# Patient Record
Sex: MALE | Race: OTHER | HISPANIC OR LATINO | Employment: FULL TIME | URBAN - METROPOLITAN AREA
[De-identification: names, ages, dates, MRNs, and addresses within clinical notes are randomized per-mention and may not be internally consistent; named-entity substitution may affect disease eponyms.]

---

## 2018-05-18 LAB
ALBUMIN SERPL BCP-MCNC: 4.4 G/DL (ref 3–5.2)
ALP SERPL-CCNC: 72 U/L (ref 43–122)
ALT SERPL W P-5'-P-CCNC: 49 U/L (ref 9–52)
ANION GAP SERPL CALCULATED.3IONS-SCNC: 11 MMOL/L (ref 5–14)
AST SERPL W P-5'-P-CCNC: 34 U/L (ref 17–59)
BILIRUB SERPL-MCNC: 0.4 MG/DL
BUN SERPL-MCNC: 21 MG/DL (ref 5–25)
CALCIUM SERPL-MCNC: 8.8 MG/DL (ref 8.4–10.2)
CHLORIDE SERPL-SCNC: 103 MEQ/L (ref 97–108)
CO2 SERPL-SCNC: 26 MMOL/L (ref 22–30)
CREATINE, SERUM (HISTORICAL): 0.83 MG/DL (ref 0.7–1.5)
EGFR (HISTORICAL): >60 ML/MIN/1.73 M2
GLUCOSE SERPL-MCNC: 85 MG/DL (ref 70–99)
POTASSIUM SERPL-SCNC: 3.7 MEQ/L (ref 3.6–5)
SODIUM SERPL-SCNC: 141 MEQ/L (ref 137–147)
TOTAL PROTEIN (HISTORICAL): 7.1 G/DL (ref 5.9–8.4)

## 2018-07-15 ENCOUNTER — HOSPITAL ENCOUNTER (EMERGENCY)
Facility: HOSPITAL | Age: 28
Discharge: HOME | End: 2018-07-15
Attending: EMERGENCY MEDICINE

## 2018-07-15 VITALS
DIASTOLIC BLOOD PRESSURE: 105 MMHG | TEMPERATURE: 98.2 F | SYSTOLIC BLOOD PRESSURE: 155 MMHG | OXYGEN SATURATION: 98 % | RESPIRATION RATE: 18 BRPM | HEART RATE: 115 BPM

## 2018-07-15 DIAGNOSIS — N45.1 EPIDIDYMITIS, RIGHT: Primary | ICD-10-CM

## 2018-07-15 LAB
BACTERIA UR QL AUTO: ABNORMAL /HPF
BILIRUB UR QL STRIP: NEGATIVE
CLARITY UR: CLEAR
COLOR UR: ABNORMAL
GLUCOSE UR STRIP-MCNC: NEGATIVE MG/DL
HGB UR QL STRIP.AUTO: 50
KETONES UR STRIP-MCNC: NEGATIVE MG/DL
LEUKOCYTE ESTERASE UR QL STRIP: 25
NITRITE UR QL STRIP: NEGATIVE
NON-SQ EPI CELLS URNS QL MICRO: ABNORMAL /HPF
PH UR STRIP.AUTO: 6 [PH] (ref 4.5–8)
PROT UR STRIP-MCNC: ABNORMAL MG/DL
RBC #/AREA URNS AUTO: ABNORMAL /HPF
SP GR UR STRIP.AUTO: 1.01 (ref 1–1.04)
UROBILINOGEN UA: NEGATIVE MG/DL
WBC #/AREA URNS AUTO: ABNORMAL /HPF

## 2018-07-15 PROCEDURE — 99284 EMERGENCY DEPT VISIT MOD MDM: CPT

## 2018-07-15 PROCEDURE — 81001 URINALYSIS AUTO W/SCOPE: CPT | Performed by: PHYSICIAN ASSISTANT

## 2018-07-15 RX ORDER — DOXYCYCLINE HYCLATE 100 MG/1
100 CAPSULE ORAL 2 TIMES DAILY
Qty: 20 CAPSULE | Refills: 0 | Status: SHIPPED | OUTPATIENT
Start: 2018-07-15 | End: 2018-07-25

## 2018-07-15 RX ORDER — IBUPROFEN 600 MG/1
600 TABLET ORAL EVERY 6 HOURS PRN
Qty: 20 TABLET | Refills: 0 | Status: SHIPPED | OUTPATIENT
Start: 2018-07-15 | End: 2018-08-24 | Stop reason: ALTCHOICE

## 2018-07-15 RX ORDER — IBUPROFEN 600 MG/1
600 TABLET ORAL EVERY 6 HOURS
COMMUNITY
Start: 2017-11-10 | End: 2018-08-24 | Stop reason: SDUPTHER

## 2018-07-15 RX ORDER — CLONIDINE HYDROCHLORIDE 0.2 MG/1
0.2 TABLET ORAL
COMMUNITY
End: 2018-08-24 | Stop reason: ALTCHOICE

## 2018-07-15 RX ORDER — AMLODIPINE BESYLATE AND BENAZEPRIL HYDROCHLORIDE 5; 40 MG/1; MG/1
CAPSULE ORAL EVERY 24 HOURS
COMMUNITY
Start: 2018-05-18 | End: 2018-08-24 | Stop reason: ALTCHOICE

## 2018-07-15 NOTE — DISCHARGE INSTRUCTIONS
Epididimitis   LO QUE NECESITA SABER:   La epididimitis es la inflamación del epidídimo  El epidídimo es un conducto enroscado dentro de mendez escroto  Éste almacena y transporta el esperma de los testículos al pene  La epididimitis aguda dura 6 semanas o menos  La epididimitis crónica dura más de 6 semanas  INSTRUCCIONES SOBRE EL PRANAV HOSPITALARIA:   Regrese a la armani de emergencias si:   · Usted tiene dolor intenso en los testículos  · Deena síntomas empeoran aún después de comenzar el tratamiento con medicamento  Pregúntele a mendez Emmanuel Pedro vitaminas y minerales son adecuados para usted  · Deena síntomas no mejoran dentro de 3 días de recibir tratamiento o regresan después del tratamiento  · Usted tiene un área caliente, jeffrey o sensible en deena testículos  · Usted tiene preguntas o inquietudes acerca de mendez condición o cuidado  Medicamentos:   · Antibióticos  podrían administrarse si la epididimitis se debe a samuel infección bacteriana  · AINEs (Analgésicos antiinflamatorios no esteroides) kaila el ibuprofeno, ayudan a disminuir la inflamación, el dolor y la [place]  Valentine medicamento esta disponible con o sin samuel receta médica  Los AINEs pueden causar sangrado estomacal o problemas renales en ciertas personas  Si usted esta tomando un anticoágulante,  siempre  pregunte si los AINEs son seguros para usted  Siempre tim la etiqueta de valentine medicamento y [place] instrucciones  No administre valentine medicamento a niños menores de 6 meses de merry sin antes obtener la autorización de mendez médico      · El acetaminofén  ivana el dolor y baja la fiebre  Está disponible sin receta médica  Pregunte la cantidad y la frecuencia con que debe tomarlos  [address]  El acetaminofén puede causar daño en el hígado cuando no se tanya de forma correcta  · Un medicamento con receta para el dolor  podrían ser Irl Brakeman  Pregunte cómo janis estos medicamentos de samuel forma sullivan      · [place] deena medicamentos kaila se le haya indicado  Consulte con lynne médico si usted anu que lynne medicamento no le está ayudando o si presenta efectos secundarios  Infórmele si es alérgico a algún medicamento  Mantenga samuel lista actualizada de los [place], las vitaminas y los productos herbales que tanya  Incluya los siguientes datos de los medicamentos: cantidad, frecuencia y motivo de administración  Traiga con usted la lista o los envases de la píldoras a tyesha citas de seguimiento  Lleve la lista de los medicamentos con usted en lolly de samuel emergencia  Cuidados personales:   · Aplique hielo  en los testículos de 15 a 20 minutos cada hora o kaila se le indique  Use un paquete de hielo o ponga hielo molido dentro de [employer]  [place]úbrala con samuel toalla  El hielo ayuda a evitar daño al tejido y a disminuir la inflamación y el dolor  · Descanse en lynne cama  según las indicaciones  Eleve lynne escroto cuando se siente o se acueste para ayudar a reducir la inflamación y el dolor  Podrían pedirle que jinny esto colocando samuel toalla enrollada bajo lynne escroto  · Soporte escrotal  podría recomendarse  Un soporte atlético proporciona soporte al escroto y podría hacerlo sentir más cómodo cuando esté de pie  Pregunte a lynne médico cómo usar un soporte atlético      · No levante objetos pesados  Usted podría empeorar la inflamación si levanta objetos pesados o si se esfuerza  Acuda a tyesha consultas de control con lynne médico según le indicaron  Anote tyesha preguntas para que se acuerde de hacerlas daniella tyesha visitas  © 2017 [address] [employer] Information is for End User's use only and may not be sold, redistributed or otherwise used for commercial purposes  All illustrations and images included in CareNotes® are the copyrighted property of A D A M , Inc  or Delon Euceda  Esta información es sólo para uso en educación  Lynne intención no es darle un consejo médico sobre enfermedades o tratamientos   Colsulte con lynne [place] farmacéutico antes de seguir cualquier régimen médico para saber si es seguro y efectivo para usted

## 2018-07-15 NOTE — ED PROVIDER NOTES
History  Chief Complaint   Patient presents with    Testicle Pain     Right testicle pain "My right one is larger than the left"  Denies fever  Seen at [facility] recently and found to have an infection they gave him a rocephin injection  Took advil yesterday for the pain  32 yo male w/ hx of HTN presents to the Emergency Department for evaluation of acute onset R testicular pain beginning this AM  Yesterday he was evaluated at [facility] urgent care for dysuria and fever, treated empirically w/ azithromycin/ceftriaxone  UA was without leukocytes; GC/Chlamydia pending  He states that his fever and dysuria are now resolved  Denies recent viral illness  No abd pain, N/V/D or hematuria  Denies testicular trauma  Prior to Admission Medications   Prescriptions Last Dose Informant Patient Reported? Taking? amLODIPine-benazepril (LOTREL) 5-40 MG per capsule 7/15/2018 at Unknown time  Yes Yes   Sig: every 24 hours   cloNIDine (CATAPRES) 0 2 mg tablet 7/15/2018 at Unknown time  Yes Yes   Sig: Take 0 2 mg by mouth   ibuprofen (MOTRIN) 600 mg tablet 7/15/2018 at Unknown time  Yes Yes   Sig: Take 600 mg by mouth every 6 (six) hours      Facility-Administered Medications: None       Past Medical History:   Diagnosis Date    Hypertension        No past surgical history on file  No family history on file  I have reviewed and agree with the history as documented  Social History   Substance Use Topics    Smoking status: Never Smoker    Smokeless tobacco: Never Used    Alcohol use Yes      Comment: Social        Review of Systems   Constitutional: Negative for chills, diaphoresis and fever  Gastrointestinal: Negative for abdominal pain, diarrhea, nausea and vomiting  Genitourinary: Positive for scrotal swelling (R) and testicular pain (R)  Negative for discharge, dysuria, flank pain, frequency, hematuria, penile pain, penile swelling and urgency     Musculoskeletal: Negative for arthralgias, back pain and myalgias  Skin: Negative for color change and rash  Allergic/Immunologic: Negative for immunocompromised state  Neurological: Negative for dizziness and light-headedness  Physical Exam  Physical Exam   Constitutional: He is oriented to person, place, and time  He appears well-developed and well-nourished  No distress  HENT:   Head: Normocephalic and atraumatic  Eyes: Pupils are equal, round, and reactive to light  No scleral icterus  Neck: No JVD present  Cardiovascular: Normal rate and regular rhythm  Exam reveals no gallop and no friction rub  No murmur heard  Pulmonary/Chest: No respiratory distress  He has no wheezes  He has no rales  Abdominal: Hernia confirmed negative in the right inguinal area and confirmed negative in the left inguinal area  Genitourinary: Penis normal  Right testis shows mass (R epididymal)  Right testis shows no swelling and no tenderness  Left testis shows no mass, no swelling and no tenderness  Circumcised  No penile erythema  No discharge found  Genitourinary Comments: Bilat cremasteric reflexes absent   Lymphadenopathy: No inguinal adenopathy noted on the right or left side  Neurological: He is alert and oriented to person, place, and time  Skin: Skin is warm and dry  Capillary refill takes less than 2 seconds  He is not diaphoretic  Psychiatric: He has a normal mood and affect  His behavior is normal  Thought content normal    Vitals reviewed        Vital Signs  ED Triage Vitals [07/15/18 1618]   Temperature Pulse Respirations Blood Pressure SpO2   98 2 °F (36 8 °C) (!) 115 18 (!) 155/105 98 %      Temp Source Heart Rate Source Patient Position - Orthostatic VS BP Location FiO2 (%)   Temporal -- -- -- --      Pain Score       3           Vitals:    07/15/18 1618   BP: (!) 155/105   Pulse: (!) 115       Visual Acuity      ED Medications  Medications - No data to display    Diagnostic Studies  Results Reviewed     Procedure Component Value Units Date/Time    Urine Microscopic [[id number]]  (Abnormal) Collected:  07/15/18 1634    Lab Status:  Final result Specimen:  Urine from Urine, Clean Catch Updated:  07/15/18 1658     RBC, UA 0-1 (A) /hpf      WBC, UA 0-1 (A) /hpf      Epithelial Cells Occasional /hpf      Bacteria, UA None Seen /hpf     UA w Reflex to Microscopic [[id number]]  (Abnormal) Collected:  07/15/18 1634    Lab Status:  Final result Specimen:  Urine from Urine, Clean Catch Updated:  07/15/18 1647     Color, UA Vanessa (A)     Clarity, UA Clear     Specific Gravity, UA 1 015     pH, UA 6 0     Leukocytes, UA 25 0 (A)     Nitrite, UA Negative     Protein, UA 30 (1+) (A) mg/dl      Glucose, UA Negative mg/dl      Ketones, UA Negative mg/dl      Bilirubin, UA Negative     Blood, UA 50 0 (A)     UROBILINOGEN UA Negative mg/dL                  No orders to display              Procedures  Procedures       Phone Contacts  ED Phone Contact    ED Course  ED Course as of Jul 15 1708   Sun Jul 15, 2018   1630 32 yo male presents w/ acute testicular pain  Treated yesterday for urethritis, swelling began this AM  Pt is non toxic, appears uncomfortable, w/ no reproducible tenderness on exam  Tachycardic but afebrile  Low clinical suspicion for torsion, testis is not high riding  Bilateral cremasteric reflexes are absent  Will obtain UA  Suspect epididymitis at this time  1700 UA without bacteria on micro  Will treat for epididymitis w/ doxycycline x 10d  MDM  Number of Diagnoses or Management Options  Epididymitis, right:   Diagnosis management comments: 32 yo healthy male presents w/ acute testicular swelling  Treated yesterday for urethritis w/ ceftriaxone/azithromycin  GC/Chlamydia testing from yesterday's UC visit pending  Pt displays no clinical findings concerning for torsion  No indication for imaging at this time  Will treat w/ doxy x 10d          Amount and/or Complexity of Data Reviewed  Clinical lab tests: ordered and reviewed  Tests in the medicine section of CPT®: reviewed and ordered  Review and summarize past medical records: yes      CritCare Time    Disposition  Final diagnoses:   Epididymitis, right     Time reflects when diagnosis was documented in both MDM as applicable and the Disposition within this note     Time User Action Codes Description Comment    7/15/2018  5:00 PM Kevin Rodriguez Add [N45 1] Epididymitis, right       ED Disposition     ED Disposition Condition Comment    Discharge  [address] discharge to home/self care  Condition at discharge: Good        Follow-up Information     Follow up With Specialties Details Why [address]  Primary Family Medicine Schedule an appointment as soon as possible for a visit  [address] [facility] [address]  [address]            Patient's Medications   Discharge Prescriptions    DOXYCYCLINE HYCLATE (VIBRAMYCIN) 100 MG CAPSULE    Take 1 capsule (100 mg total) by mouth 2 (two) times a day for 10 days       Start Date: 7/15/2018 End Date: 7/25/2018       Order Dose: 100 mg       Quantity: 20 capsule    Refills: 0    IBUPROFEN (MOTRIN) 600 MG TABLET    Take 1 tablet (600 mg total) by mouth every 6 (six) hours as needed for mild pain       Start Date: 7/15/2018 End Date: --       Order Dose: 600 mg       Quantity: 20 tablet    Refills: 0     No discharge procedures on file      ED Provider  Electronically Signed by           Judson Miranda PA-C  07/15/18 4573

## 2018-07-26 ENCOUNTER — APPOINTMENT (EMERGENCY)
Dept: ULTRASOUND IMAGING | Facility: HOSPITAL | Age: 28
End: 2018-07-26
Payer: COMMERCIAL

## 2018-07-26 ENCOUNTER — HOSPITAL ENCOUNTER (EMERGENCY)
Facility: HOSPITAL | Age: 28
Discharge: HOME | End: 2018-07-26
Attending: EMERGENCY MEDICINE | Admitting: EMERGENCY MEDICINE
Payer: COMMERCIAL

## 2018-07-26 VITALS
HEART RATE: 90 BPM | RESPIRATION RATE: 16 BRPM | DIASTOLIC BLOOD PRESSURE: 85 MMHG | SYSTOLIC BLOOD PRESSURE: 154 MMHG | OXYGEN SATURATION: 99 % | TEMPERATURE: 97.5 F

## 2018-07-26 DIAGNOSIS — N50.811 TESTICULAR PAIN, RIGHT: Primary | ICD-10-CM

## 2018-07-26 PROCEDURE — 99284 EMERGENCY DEPT VISIT MOD MDM: CPT

## 2018-07-26 PROCEDURE — 76870 US EXAM SCROTUM: CPT

## 2018-07-26 NOTE — ED NOTES
Pt was seen here on 7/15/18 for Rt testicular pain  Reports his Rt teste is still swollen and there is still some pain in the Rt teste and Rt groin        Renetta James RN  07/26/18 1354

## 2018-07-26 NOTE — ED PROVIDER NOTES
History  Chief Complaint   Patient presents with    Testicle Pain     Patient states "Im here for a recheck it doesn't hurt when i pee, but my testicle still hurts"  Testicle Pain   Location:  Right testicle  Quality:  Dull ache  Severity:  Mild  Onset quality:  Gradual  Duration:  12 days  Timing:  Constant  Progression:  Unchanged  Chronicity:  New  Context:  Previously diagnosed with epididymitis  Relieved by:  Ibuprofen  Worsened by:  Nothing  Ineffective treatments:  Antibiotics  Associated symptoms: no abdominal pain, no chest pain, no congestion, no cough, no diarrhea, no ear pain, no fatigue, no fever, no headaches, no loss of consciousness, no myalgias, no nausea, no rash, no rhinorrhea, no shortness of breath, no sore throat, no vomiting and no wheezing        Prior to Admission Medications   Prescriptions Last Dose Informant Patient Reported? Taking? amLODIPine-benazepril (LOTREL) 5-40 MG per capsule   Yes No   Sig: every 24 hours   cloNIDine (CATAPRES) 0 2 mg tablet   Yes No   Sig: Take 0 2 mg by mouth   doxycycline hyclate (VIBRAMYCIN) 100 mg capsule   No No   Sig: Take 1 capsule (100 mg total) by mouth 2 (two) times a day for 10 days   ibuprofen (MOTRIN) 600 mg tablet   Yes No   Sig: Take 600 mg by mouth every 6 (six) hours   ibuprofen (MOTRIN) 600 mg tablet   No No   Sig: Take 1 tablet (600 mg total) by mouth every 6 (six) hours as needed for mild pain      Facility-Administered Medications: None       Past Medical History:   Diagnosis Date    Hypertension        History reviewed  No pertinent surgical history  Family History   Problem Relation Age of Onset    Hypertension Mother     Diabetes Mother     Diabetes Maternal Grandmother     Diabetes Maternal Grandfather     Lung cancer Family      I have reviewed and agree with the history as documented      Social History   Substance Use Topics    Smoking status: Never Smoker    Smokeless tobacco: Never Used    Alcohol use Yes Comment: Social        Review of Systems   Constitutional: Negative for activity change, appetite change, chills, fatigue and fever  HENT: Negative for congestion, ear pain, rhinorrhea, sneezing and sore throat  Eyes: Negative for pain and visual disturbance  Respiratory: Negative for cough, shortness of breath and wheezing  Cardiovascular: Negative for chest pain and palpitations  Gastrointestinal: Negative for abdominal pain, blood in stool, constipation, diarrhea, nausea and vomiting  Genitourinary: Positive for scrotal swelling and testicular pain  Negative for decreased urine volume, discharge, dysuria, frequency, genital sores, hematuria, penile pain, penile swelling and urgency  Musculoskeletal: Negative for arthralgias, myalgias, neck pain and neck stiffness  Skin: Negative for rash and wound  Neurological: Negative for dizziness, loss of consciousness, weakness, light-headedness, numbness and headaches  All other systems reviewed and are negative  Physical Exam  Physical Exam   Constitutional: He is oriented to person, place, and time  He appears well-developed and well-nourished  No distress  HENT:   Head: Normocephalic and atraumatic  Nose: Nose normal    Eyes: Conjunctivae and EOM are normal  Pupils are equal, round, and reactive to light  Neck: Normal range of motion  Neck supple  Cardiovascular: Normal rate, regular rhythm, normal heart sounds and intact distal pulses  Exam reveals no gallop and no friction rub  No murmur heard  Pulmonary/Chest: Effort normal and breath sounds normal  No respiratory distress  He has no wheezes  He has no rales  Abdominal: Soft  He exhibits no distension  There is no tenderness  Genitourinary:   Genitourinary Comments: Mild tenderness to right testicular palpation  NO obvious swelling or erythema noted  Absent right cremaster reflex, sluggish left cremaster reflex though present   Musculoskeletal: Normal range of motion   He exhibits no edema, tenderness or deformity  Lymphadenopathy:     He has no cervical adenopathy  Neurological: He is alert and oriented to person, place, and time  Skin: Skin is warm and dry  Capillary refill takes less than 2 seconds  He is not diaphoretic  No erythema  No pallor  Nursing note and vitals reviewed  Vital Signs  ED Triage Vitals [07/26/18 1449]   Temperature Pulse Respirations Blood Pressure SpO2   97 5 °F (36 4 °C) 90 16 154/85 99 %      Temp Source Heart Rate Source Patient Position - Orthostatic VS BP Location FiO2 (%)   Temporal -- -- -- --      Pain Score       --           Vitals:    07/26/18 1449   BP: 154/85   Pulse: 90       Visual Acuity      ED Medications  Medications - No data to display    Diagnostic Studies  Results Reviewed     None                 US scrotum and testicles   Final Result by Nate Delgado DO (07/26 1736)       Unremarkable testes without evidence of torsion  Small mildly complicated bilateral hydroceles  Workstation performed: [id number]                    Procedures  Procedures       Phone Contacts  ED Phone Contact    ED Course  ED Course as of Jul 29 0847   Thu Jul 26, 2018   1752 Otherwise unremarkable US other than mild BL hydrocele  Plan will be to talk to urology about patient to attempt to establish follow up     [phone] No call back from urology  Apparently there is no one on call for [facility] for urology and urology SALLY Lundy who is on call for SLA, SLQ, and [facility] did not return page  MDM  Number of Diagnoses or Management Options  Testicular pain, right: new and requires workup  Diagnosis management comments: Patient is a 76-year-old male with recent diagnosis of epididymitis presents to the emergency department for evaluation of testicular pain  PT states that he was first disgnosed with urethitis at urgent care then was diagnosed the next day with epididymitis at this ED   He was started on doxycycline and ibuprofen  States that ibuprofen would help with the pain but is still have persistent pain despite completing the abx  STates that pain is in his right scrotum  Patient has not seen Urology and was not given any number for Urology  No fevers, chills, sweats  No dysuria symptoms currently at this time  Plan will be to get testicular ultrasound and have patient follow up Urology  Amount and/or Complexity of Data Reviewed  Tests in the radiology section of CPT®: ordered and reviewed    Risk of Complications, Morbidity, and/or Mortality  Presenting problems: moderate  Diagnostic procedures: moderate  Management options: moderate    Patient Progress  Patient progress: stable    CritCare Time    Disposition  Final diagnoses:   Testicular pain, right     Time reflects when diagnosis was documented in both MDM as applicable and the Disposition within this note     Time User Action Codes Description Comment    7/26/2018  6:32 PM Amanda Brown Add [N50 811] Testicular pain, right       ED Disposition     ED Disposition Condition Comment    Discharge  Joe 128 discharge to home/self care      Condition at discharge: Stable        Follow-up Information     Follow up With Specialties Details Why Jamie Murillo U  [address]  For Urology [place] [facility] Call in 1 day for ED follow up and follow up testicular pain  [facility] [address]  [address] [facility] Emergency Department Emergency Medicine  If symptoms worsen [address] [address] [phone]          Discharge Medication List as of 7/26/2018  6:33 PM      CONTINUE these medications which have NOT CHANGED    Details   amLODIPine-benazepril (LOTREL) 5-40 MG per capsule every 24 hours, Starting Fri 5/18/2018, Historical Med      cloNIDine (CATAPRES) 0 2 mg tablet Take 0 2 mg by mouth, Historical Med      !! ibuprofen (MOTRIN) 600 mg tablet Take 600 mg by mouth every 6 (six) hours, Starting Fri 11/10/2017, Until Sat 11/10/2018, Historical Med      !! ibuprofen (MOTRIN) 600 mg tablet Take 1 tablet (600 mg total) by mouth every 6 (six) hours as needed for mild pain, Starting Sun 7/15/2018, Print       !! - Potential duplicate medications found  Please discuss with provider  STOP taking these medications       doxycycline hyclate (VIBRAMYCIN) 100 mg capsule Comments:   Reason for Stopping:             No discharge procedures on file      ED Provider  Electronically Signed by           Jennifer Brito PA-C  07/29/18 0375

## 2018-07-26 NOTE — DISCHARGE INSTRUCTIONS
Dolor en el escroto   LO QUE NECESITA SABER:   El dolor en el escroto puede ocurrir a cualquier edad  La causa del dolor en el escroto puede ir desde samuel lesión grecia hasta samuel grave afección médica  Es muy importante buscar atención médica inmediata si se presenta dolor en el escroto  El dolor puede ser un signo de advertencia de May  condición grave que necesita tratamiento  Sin atención médica inmediata, puede estar en mayor riesgo de perder un testículo o quedar estéril (imposibilidad de tener hijos)  INSTRUCCIONES SOBRE EL PRANAV HOSPITALARIA:   Regrese a la armani de emergencias si:   · Tiene algún signo de advertencia de un problema grave  · Usted tiene dolor o inflamación que comienza o empeora rápidamente  · Se presentan cambios en la piel del escroto, kaila samuel alondra Mario  · Usted tiene fiebre  Pregúntele a mendez Emmanuel Pedro vitaminas y minerales son adecuados para usted  · Mendez dolor no mejora, incluso después de janis el medicamento para el dolor  · Usted siente un dolor nuevo o el dolor empeora  · Usted tiene preguntas o inquietudes acerca de mendez condición o cuidado  Medicamentos:  Es posible que usted necesite alguno de los siguientes:  · Un medicamento con receta para el dolor  podrían ser Irl Brakeman  Pregunte al médico cómo debe janis valentine medicamento de forma sullivan  Algunos medicamentos recetados para el dolor contienen acetaminofén  No tome otros medicamentos que contengan acetaminofén sin consultarlo con mendez médico  Demasiado acetaminofeno puede causar daño al hígado  Los medicamentos recetados para el dolor podrían causar estreñimiento  Pregunte a mendez médico kaila prevenir o tratar estreñimiento  · AINEs (Analgésicos antiinflamatorios no esteroides) kaila el ibuprofeno, ayudan a disminuir la inflamación, el dolor y la [place]  Valentine medicamento esta disponible con o sin samuel receta médica  Los AINEs pueden causar sangrado estomacal o problemas renales en ciertas personas   Si usted tanya un medicamento anticoagulante, siempre pregúntele a lynne médico si los ERICA son seguros para usted  Siempre tim la etiqueta de valentine medicamento y [place] instrucciones  · Antibióticos  se usan para tratar samuel infección bacteriana  · [place] tyesha medicamentos kaila se le haya indicado  Consulte con lynne médico si usted anu que lynne medicamento no le está ayudando o si presenta efectos secundarios  Infórmele si es alérgico a cualquier medicamento  Mantenga samuel lista actualizada de los [place], las vitaminas y los productos herbales que tanya  Incluya los siguientes datos de los medicamentos: cantidad, frecuencia y motivo de administración  Traiga con usted la lista o los envases de la píldoras a tyesha citas de seguimiento  Lleve la lista de los medicamentos con usted en lolly de samuel emergencia  El manejo de lynne síntomas:   · Use un dispositivo de apoyo, si se lo indican  Un dispositivo de [de-identified], kaila un suspensorio, puede ayudar a mantener el Dorethia English y apoyado  [place] puede ayudar a disminuir el dolor  · Aplíquese hielo en la escroto  El hielo ayuda a disminuir el dolor y la inflamación  Use un paquete de hielo o ponga hielo molido dentro de [employer]  Cubra el paquete o la [place] con samuel toalla antes de colocarlos sobre el escroto  Aplique hielo daniella 15 a 20 minutos por hora o según indicaciones  Acuda a tyesha consultas de control con lynne médico según le indicaron  Anote tyesha preguntas para que se acuerde de hacerlas daniella tyesha visitas  © 2017 [employer] Information is for End User's use only and may not be sold, redistributed or otherwise used for commercial purposes  All illustrations and images included in CareNotes® are the copyrighted property of A D A M , Inc  or Delon [employer]uced[employer]  Esta información es sólo para uso en educación  Lynne intención no es darle un consejo médico sobre enfermedades o tratamientos   Colsulte con lynne médico, enfermera o farmacéutico antes de seguir cualquier régimen médico para saber si es seguro y efectivo para usted

## 2018-08-24 ENCOUNTER — OFFICE VISIT (OUTPATIENT)
Dept: FAMILY MEDICINE CLINIC | Facility: CLINIC | Age: 28
End: 2018-08-24
Payer: COMMERCIAL

## 2018-08-24 VITALS
BODY MASS INDEX: 35.7 KG/M2 | TEMPERATURE: 98 F | SYSTOLIC BLOOD PRESSURE: 160 MMHG | RESPIRATION RATE: 16 BRPM | DIASTOLIC BLOOD PRESSURE: 108 MMHG | WEIGHT: 241 LBS | OXYGEN SATURATION: 98 % | HEART RATE: 80 BPM | HEIGHT: 69 IN

## 2018-08-24 DIAGNOSIS — N50.819 TESTICULAR PAIN: Primary | ICD-10-CM

## 2018-08-24 DIAGNOSIS — I10 ESSENTIAL HYPERTENSION, BENIGN: ICD-10-CM

## 2018-08-24 PROCEDURE — 99214 OFFICE O/P EST MOD 30 MIN: CPT | Performed by: FAMILY MEDICINE

## 2018-08-24 RX ORDER — LEVOFLOXACIN 750 MG/1
750 TABLET ORAL EVERY 24 HOURS
Qty: 7 TABLET | Refills: 0 | Status: SHIPPED | OUTPATIENT
Start: 2018-08-24 | End: 2018-08-31

## 2018-08-24 RX ORDER — OLMESARTAN MEDOXOMIL, AMLODIPINE AND HYDROCHLOROTHIAZIDE TABLET 40/10/25 MG 40; 10; 25 MG/1; MG/1; MG/1
1 TABLET ORAL DAILY
Qty: 30 TABLET | Refills: 5 | Status: SHIPPED | OUTPATIENT
Start: 2018-08-24 | End: 2018-09-21 | Stop reason: SINTOL

## 2018-08-24 NOTE — PROGRESS NOTES
Assessment/Plan:    Essential hypertension, benign  For control hypertension, we are going to switch to a different combination of olmesartan with amlodipine and hydrochlorothiazide and recheck blood pressure in one month  Testicular pain  Seems to me he is having an acute epididymitis despite the treatment every given at the emergency room two weeks ago  I would treat him with more aggressive antibiotic and use tramadol with acetaminophen for pain his I do not want to give NSAIDs can increase the blood pressure higher than it is Already       Diagnoses and all orders for this visit:    Testicular pain  -     levofloxacin (LEVAQUIN) 750 mg tablet; Take 1 tablet (750 mg total) by mouth every 24 hours for 7 days  -     traMADol-acetaminophen (ULTRACET) 37 5-325 mg per tablet; Take 1 tablet by mouth every 8 (eight) hours as needed for moderate pain    Essential hypertension, benign  -     Olmesartan-Amlodipine-HCTZ (TRIBENZOR) 40-10-25 MG TABS; Take 1 tablet by mouth daily          Subjective:      Patient ID: Victor Hugo Booth is a 33 y o  male  PT here for followup post ER  PT continues to have testicular pain, seen by urologist post ER  His pain is mainly in the left testicle and is presenting with a complaint of redness and more tenderness than before  Hypertension   This is a recurrent problem  The current episode started more than 1 year ago  The problem has been waxing and waning since onset  The problem is uncontrolled  Pertinent negatives include no chest pain, headaches or shortness of breath  Risk factors for coronary artery disease include male gender and obesity  The current treatment provides mild improvement         The following portions of the patient's history were reviewed and updated as appropriate: allergies, current medications, past family history, past medical history, past social history, past surgical history and problem list     Review of Systems   Constitutional: Negative for activity change, appetite change, fatigue and fever  HENT: Negative for congestion, dental problem, ear pain, sinus pain and trouble swallowing  Eyes: Negative for discharge, redness and itching  Respiratory: Negative for cough, shortness of breath and wheezing  Cardiovascular: Negative for chest pain  Gastrointestinal: Negative for abdominal distention and abdominal pain  Genitourinary: Positive for scrotal swelling and testicular pain (Persistent pain despite antibiotics)  Negative for difficulty urinating, dysuria and enuresis  Testicular pain   Musculoskeletal: Negative for arthralgias, back pain and gait problem  Neurological: Negative for dizziness and headaches  Hematological: Negative for adenopathy  Does not bruise/bleed easily  Psychiatric/Behavioral: Negative for behavioral problems  Objective:      BP (!) 160/108 (BP Location: Left arm, Patient Position: Sitting, Cuff Size: Large)   Pulse 80   Temp 98 °F (36 7 °C) (Oral)   Resp 16   Ht 5' 9" (1 753 m)   Wt 109 kg (241 lb)   SpO2 98%   BMI 35 59 kg/m²          Physical Exam   Constitutional: He is oriented to person, place, and time  He appears well-developed and well-nourished  HENT:   Head: Normocephalic  Eyes: Pupils are equal, round, and reactive to light  Neck: Normal range of motion  Cardiovascular: Normal rate, regular rhythm and normal heart sounds  Pulmonary/Chest: Effort normal and breath sounds normal    Abdominal: Soft  Bowel sounds are normal    Genitourinary: Penis normal  Right testis shows swelling (And redness is present) and tenderness  Musculoskeletal: Normal range of motion  Neurological: He is alert and oriented to person, place, and time  He has normal reflexes  Skin: Skin is warm and dry  Psychiatric: He has a normal mood and affect   His behavior is normal  Judgment and thought content normal

## 2018-08-24 NOTE — ASSESSMENT & PLAN NOTE
For control hypertension, we are going to switch to a different combination of olmesartan with amlodipine and hydrochlorothiazide and recheck blood pressure in one month

## 2018-08-24 NOTE — PATIENT INSTRUCTIONS
Dolor testicular   LO QUE NECESITA SABER:   El dolor testicular podría comenzar en mendez escroto y extenderse a mendez abdomen  Es probable que usted sienta un dolor lorin y repentino, o dolor sordo que sucede con el paso del [place]  El dolor en tyesha testículos podría ir y venir o podría durar por mucho tiempo  La causa de mendez dolor podría ser desconocida  La causa del dolor en tyesha testículos podría ser samuel infección, trauma, hernia, cálculos renales, o enfermedades de transmisión sexual (ETS)  Usted podría tener samuel protuberancia dolorosa en el escroto  La protuberancia podría ser causada por un engrandecimiento de vena o por líquido que se ha acumulado alrededor de collin de tyesha testículos  Esta protuberancia también podría ser causada por samuel condición médica aún más seria  Parte de mendez testículo podría torcerse  Esta es samuel condición seria que necesita tratamiento tan pronto kaila sea posible  INSTRUCCIONES SOBRE EL PRANAV HOSPITALARIA:   Medicamentos:   · Antibióticos:  Estos medicamentos ayudan a combatir o prevenir infecciones  Alvarado tyesha antibióticos hasta que se los termine, aunque se sienta mejor  · Analgésicos:  Es posible que le receten un medicamento para aliviar el dolor  No espere hasta que el dolor sea severo antes de janis valentine medicamento  · AINEs (analgésicos antiinflamatorios no esteroides):  Estos medicamentos disminuyen la inflamación, dolor y [place]  Los AINEs se pueden obtener sin receta médica  Consulte con mendez médico cuál medicamento es el adecuado para usted  Pregunte cuánto janis y cuándo  Tómelos kaila se le indique  Cuando no se eda de la [employer], los medicamentos antiinflamatorios no esteroides pueden causar sangrado estomacal y problemas renales  · Alvarado tyesha medicamentos kaila se le haya indicado  Consulte con mendez médico si usted nau que mendez medicamento no le está ayudando o si presenta efectos secundarios  Infórmele si es alérgico a cualquier medicamento   Mantenga samuel lista actualizada de los medicamentos, las vitaminas y los productos herbales que tanya  Incluya los siguientes datos de los medicamentos: cantidad, frecuencia y motivo de administración  Traiga con usted la lista o los envases de la píldoras a tyesha citas de seguimiento  Lleve la lista de los medicamentos con usted en lolly de samuel emergencia  Disminuya el malestar:  Con tratamiento, mendez dolor podría mejorar en 1 a 3 días  Mendez condición podría janis hasta 4 semanas para sanar, dependiendo de la causa del dolor que usted siente en los testículos  · Descanse:  Limite mendez actividad hasta que mendez dolor haya disminuido  Descanse más [employer]  No se siente por largos periodos de tiempo  · Compresas frías:  Póngase samuel compresa fría en los testículos para ayudar a aliviar el dolor  Use compresas frías kaila se le indica  · Elevación:  Suavemente ponga samuel toalla doblada debajo de tyesha testículos para elevarlos cuando usted se sienta en samuel silla o se acuesta en samuel cama  [place] le ayudará a aliviar mendez dolor y a disminuir la inflamación  Acuda a samuel consulta de control con mendez médico o mendez urólogo dentro de 3 a 7 días:  Anote tyesha preguntas para que se acuerde de hacerlas daniella tyesha visitas  Relaciones sexuales:  Evite la actividad sexual hasta que haya terminado de janis tyesha antibióticos o hasta que mendez médico le indique que puede tener contacto sexual de manera sullivan  Use condones para reducir el riesgo de contraer enfermedades de transmisión sexual   Comuníquese con mendez médico o urólogo si:   · Usted siente que mendez medicamento o tratamiento no está funcionando  · Usted siente más dolor, sensibilidad o inflamación que antes  · Usted tiene náuseas o fiebre leve  · Usted tiene preguntas o inquietudes acerca de mendez condición o cuidado  Regrese a la armani de emergencias si:   · Usted siente dolor repentino o intenso en los testículos o abdomen  · Usted siente dolor en ambos testículos  · Usted esta vomitando      · Usted tiene fiebre gino  · Mendez dolor aumenta cuando tyesha testículos están en samuel posición elevada  · Mendez escroto se pone color ellie  [place] podría indicar que mendez testículo no está recibiendo el flujo de corey que necesita  © 2017 [employer] Information is for End User's use only and may not be sold, redistributed or otherwise used for commercial purposes  All illustrations and images included in CareNotes® are the copyrighted property of A D A M , Inc  or [employer]  Esta información es sólo para uso en educación  Mendez intención no es darle un consejo médico sobre enfermedades o tratamientos  Colsulte con mendez Catherne Micah farmacéutico antes de seguir cualquier régimen médico para saber si es seguro y efectivo para usted

## 2018-08-24 NOTE — ASSESSMENT & PLAN NOTE
Seems to me he is having an acute epididymitis despite the treatment every given at the emergency room two weeks ago    I would treat him with more aggressive antibiotic and use tramadol with acetaminophen for pain his I do not want to give NSAIDs can increase the blood pressure higher than it is Already

## 2018-09-09 ENCOUNTER — HOSPITAL ENCOUNTER (EMERGENCY)
Facility: HOSPITAL | Age: 28
Discharge: HOME | End: 2018-09-09
Attending: EMERGENCY MEDICINE | Admitting: EMERGENCY MEDICINE
Payer: COMMERCIAL

## 2018-09-09 ENCOUNTER — APPOINTMENT (EMERGENCY)
Dept: ULTRASOUND IMAGING | Facility: HOSPITAL | Age: 28
End: 2018-09-09
Payer: COMMERCIAL

## 2018-09-09 VITALS
SYSTOLIC BLOOD PRESSURE: 142 MMHG | OXYGEN SATURATION: 100 % | HEART RATE: 102 BPM | DIASTOLIC BLOOD PRESSURE: 87 MMHG | BODY MASS INDEX: 34.7 KG/M2 | WEIGHT: 235 LBS | TEMPERATURE: 98.2 F | RESPIRATION RATE: 18 BRPM

## 2018-09-09 DIAGNOSIS — R10.13 EPIGASTRIC ABDOMINAL PAIN: Primary | ICD-10-CM

## 2018-09-09 DIAGNOSIS — K80.20 CALCULUS OF GALLBLADDER WITHOUT CHOLECYSTITIS WITHOUT OBSTRUCTION: ICD-10-CM

## 2018-09-09 DIAGNOSIS — M54.12 RIGHT CERVICAL RADICULOPATHY: ICD-10-CM

## 2018-09-09 LAB
ALBUMIN SERPL BCP-MCNC: 5 G/DL (ref 3–5.2)
ALP SERPL-CCNC: 83 U/L (ref 43–122)
ALT SERPL W P-5'-P-CCNC: 58 U/L (ref 9–52)
ANION GAP SERPL CALCULATED.3IONS-SCNC: 14 MMOL/L (ref 5–14)
APTT PPP: 33 SECONDS (ref 23–34)
AST SERPL W P-5'-P-CCNC: 53 U/L (ref 17–59)
BASOPHILS # BLD AUTO: 0.1 THOUSANDS/ΜL (ref 0–0.1)
BASOPHILS NFR BLD AUTO: 1 % (ref 0–1)
BILIRUB SERPL-MCNC: 0.4 MG/DL
BUN SERPL-MCNC: 18 MG/DL (ref 5–25)
CALCIUM SERPL-MCNC: 9.2 MG/DL (ref 8.4–10.2)
CHLORIDE SERPL-SCNC: 97 MMOL/L (ref 97–108)
CO2 SERPL-SCNC: 28 MMOL/L (ref 22–30)
CREAT SERPL-MCNC: 0.69 MG/DL (ref 0.7–1.5)
EOSINOPHIL # BLD AUTO: 0.2 THOUSAND/ΜL (ref 0–0.4)
EOSINOPHIL NFR BLD AUTO: 2 % (ref 0–6)
ERYTHROCYTE [DISTWIDTH] IN BLOOD BY AUTOMATED COUNT: 13.2 %
GFR SERPL CREATININE-BSD FRML MDRD: 128 ML/MIN/1.73SQ M
GLUCOSE SERPL-MCNC: 100 MG/DL (ref 70–99)
HCT VFR BLD AUTO: 48.1 % (ref 41–53)
HGB BLD-MCNC: 16.6 G/DL (ref 13.5–17.5)
INR PPP: 0.97 (ref 0.89–1.1)
LIPASE SERPL-CCNC: 53 U/L (ref 23–300)
LYMPHOCYTES # BLD AUTO: 2.3 THOUSANDS/ΜL (ref 0.5–4)
LYMPHOCYTES NFR BLD AUTO: 27 % (ref 20–50)
MCH RBC QN AUTO: 31 PG (ref 26–34)
MCHC RBC AUTO-ENTMCNC: 34.6 G/DL (ref 31–36)
MCV RBC AUTO: 90 FL (ref 80–100)
MONOCYTES # BLD AUTO: 0.6 THOUSAND/ΜL (ref 0.2–0.9)
MONOCYTES NFR BLD AUTO: 7 % (ref 1–10)
NEUTROPHILS # BLD AUTO: 5.6 THOUSANDS/ΜL (ref 1.8–7.8)
NEUTS SEG NFR BLD AUTO: 64 % (ref 45–65)
PLATELET # BLD AUTO: 262 THOUSANDS/UL (ref 150–450)
PMV BLD AUTO: 9.2 FL (ref 8.9–12.7)
POTASSIUM SERPL-SCNC: 3.4 MMOL/L (ref 3.6–5)
PROT SERPL-MCNC: 9.1 G/DL (ref 5.9–8.4)
PROTHROMBIN TIME: 10.3 SECONDS (ref 9.5–11.6)
RBC # BLD AUTO: 5.37 MILLION/UL (ref 4.5–5.9)
SODIUM SERPL-SCNC: 139 MMOL/L (ref 137–147)
TROPONIN I SERPL-MCNC: <0.01 NG/ML (ref 0–0.03)
WBC # BLD AUTO: 8.8 THOUSAND/UL (ref 4.5–11)

## 2018-09-09 PROCEDURE — 83690 ASSAY OF LIPASE: CPT | Performed by: EMERGENCY MEDICINE

## 2018-09-09 PROCEDURE — 93005 ELECTROCARDIOGRAM TRACING: CPT

## 2018-09-09 PROCEDURE — 84484 ASSAY OF TROPONIN QUANT: CPT | Performed by: EMERGENCY MEDICINE

## 2018-09-09 PROCEDURE — 36415 COLL VENOUS BLD VENIPUNCTURE: CPT | Performed by: EMERGENCY MEDICINE

## 2018-09-09 PROCEDURE — 85025 COMPLETE CBC W/AUTO DIFF WBC: CPT | Performed by: EMERGENCY MEDICINE

## 2018-09-09 PROCEDURE — 85610 PROTHROMBIN TIME: CPT | Performed by: EMERGENCY MEDICINE

## 2018-09-09 PROCEDURE — 80053 COMPREHEN METABOLIC PANEL: CPT | Performed by: EMERGENCY MEDICINE

## 2018-09-09 PROCEDURE — 99285 EMERGENCY DEPT VISIT HI MDM: CPT

## 2018-09-09 PROCEDURE — 96374 THER/PROPH/DIAG INJ IV PUSH: CPT

## 2018-09-09 PROCEDURE — 76705 ECHO EXAM OF ABDOMEN: CPT

## 2018-09-09 PROCEDURE — C9113 INJ PANTOPRAZOLE SODIUM, VIA: HCPCS

## 2018-09-09 PROCEDURE — 85730 THROMBOPLASTIN TIME PARTIAL: CPT | Performed by: EMERGENCY MEDICINE

## 2018-09-09 RX ORDER — PANTOPRAZOLE SODIUM 40 MG/1
INJECTION, POWDER, FOR SOLUTION INTRAVENOUS
Status: COMPLETED
Start: 2018-09-09 | End: 2018-09-09

## 2018-09-09 RX ORDER — PANTOPRAZOLE SODIUM 40 MG/1
40 INJECTION, POWDER, FOR SOLUTION INTRAVENOUS ONCE
Status: COMPLETED | OUTPATIENT
Start: 2018-09-09 | End: 2018-09-09

## 2018-09-09 RX ADMIN — PANTOPRAZOLE SODIUM 40 MG: 40 INJECTION, POWDER, FOR SOLUTION INTRAVENOUS at 17:42

## 2018-09-09 RX ADMIN — SODIUM CHLORIDE 1000 ML: 9 INJECTION, SOLUTION INTRAVENOUS at 14:40

## 2018-09-09 NOTE — ED PROVIDER NOTES
History  Chief Complaint   Patient presents with    Chest Pain     "I have CP on the right side of my chest and my right upper back and it's going down my right arm  It's going on for one week  I am also nauseated"  Denies v/d     Drinks coffee every day but does not drink sodas  , denies cigarette smoking denies alcohol intake ever, denies any drug use  Denies any use of NSAIDs for any particular reason  Also complains of 1 week history of right shoulder pain radiating down his arm into his right hand and also right infrascapular region and right postero lateral region  He denies any neck pain  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     He is right-handed        History provided by:  Patient   used: No    Chest Pain   Pain location:  Epigastric  Pain quality: aching    Pain quality: not burning, not crushing, not dull, not hot, no pressure, not radiating, not sharp, not shooting, not stabbing and not tearing    Pain radiates to:  Mid back (Right-sided)  Pain radiates to the back: yes    Pain severity:  Mild  Onset quality:  Gradual  Duration:  1 week  Timing:  Constant  Progression:  Waxing and waning  Chronicity:  New  Context: not breathing, no drug use, not eating, no intercourse, not lifting, no movement, not raising an arm, not at rest, no stress and no trauma    Relieved by:  Nothing  Worsened by:  Nothing tried  Ineffective treatments:  None tried  Associated symptoms: abdominal pain (Epigastric) and back pain (Right sided postdural lateral)    Associated symptoms: no AICD problem, no altered mental status, no anorexia, no anxiety, no claudication, no cough, no dizziness, no dysphagia, no fatigue, no fever, no headache, no heartburn, no lower extremity edema, no numbness, no orthopnea, no palpitations, no PND, no shortness of breath, no syncope, not vomiting and no weakness    Risk factors: hypertension    Risk factors: no aortic disease, no birth control, no coronary artery disease, no diabetes mellitus, no Tobias-Danlos syndrome, no high cholesterol, no immobilization, not male, no Marfan's syndrome, not obese, not pregnant, no prior DVT/PE, no smoking and no surgery        Prior to Admission Medications   Prescriptions Last Dose Informant Patient Reported? Taking? Olmesartan-Amlodipine-HCTZ (TRIBENZOR) 40-10-25 MG TABS   No No   Sig: Take 1 tablet by mouth daily   traMADol-acetaminophen (ULTRACET) 37 5-325 mg per tablet   No No   Sig: Take 1 tablet by mouth every 8 (eight) hours as needed for moderate pain      Facility-Administered Medications: None       Past Medical History:   Diagnosis Date    Hypertension        History reviewed  No pertinent surgical history  Family History   Problem Relation Age of Onset    Hypertension Mother     Diabetes Mother     Diabetes Maternal Grandmother     Diabetes Maternal Grandfather     Lung cancer Family      I have reviewed and agree with the history as documented  Social History   Substance Use Topics    Smoking status: Never Smoker    Smokeless tobacco: Never Used    Alcohol use Yes      Comment: Social        Review of Systems   Constitutional: Negative  Negative for fatigue and fever  HENT: Negative  Negative for trouble swallowing  Eyes: Negative  Respiratory: Negative  Negative for apnea, cough, choking, chest tightness, shortness of breath, wheezing and stridor  Cardiovascular: Positive for chest pain ( right mid axillary)  Negative for palpitations, orthopnea, claudication, leg swelling, syncope and PND  Gastrointestinal: Positive for abdominal pain (Epigastric)  Negative for anorexia, heartburn and vomiting  Endocrine: Negative  Genitourinary: Negative  Musculoskeletal: Positive for back pain (Right sided postdural lateral)  Negative for gait problem, joint swelling, myalgias, neck pain and neck stiffness          Right shoulder pain which radiates right infrascapular region right lateral chest region for the past 1 week   Skin: Negative  Allergic/Immunologic: Negative  Neurological: Negative  Negative for dizziness, tremors, seizures, syncope, facial asymmetry, speech difficulty, weakness, light-headedness, numbness and headaches  Hematological: Negative  Psychiatric/Behavioral: Negative  All other systems reviewed and are negative        Physical Exam  Physical Exam    Vital Signs  ED Triage Vitals   Temperature Pulse Respirations Blood Pressure SpO2   09/09/18 1349 09/09/18 1349 09/09/18 1349 09/09/18 1349 09/09/18 1349   98 2 °F (36 8 °C) (!) 122 20 158/84 100 %      Temp src Heart Rate Source Patient Position - Orthostatic VS BP Location FiO2 (%)   -- 09/09/18 1604 09/09/18 1604 09/09/18 1604 --    Monitor Lying Left arm       Pain Score       --                  Vitals:    09/09/18 1349 09/09/18 1604   BP: 158/84 142/87   Pulse: (!) 122 102   Patient Position - Orthostatic VS:  Lying       Visual Acuity      ED Medications  Medications   sodium chloride 0 9 % bolus 1,000 mL (0 mL Intravenous Stopped 9/9/18 1505)   pantoprazole (PROTONIX) injection 40 mg (40 mg Intravenous Given 9/9/18 1742)       Diagnostic Studies  Results Reviewed     Procedure Component Value Units Date/Time    Troponin I [[id number]]  (Normal) Collected:  09/09/18 1454    Lab Status:  Final result Specimen:  Blood from Arm, Left Updated:  09/09/18 1512     Troponin I <0 01 ng/mL     APTT [[id number]]  (Normal) Collected:  09/09/18 1454    Lab Status:  Final result Specimen:  Blood Updated:  09/09/18 1508     PTT 33 seconds     Narrative:       PTT:      Protime-INR [[id number]]  (Normal) Collected:  09/09/18 1454    Lab Status:  Final result Specimen:  Blood Updated:  09/09/18 1508     Protime 10 3 seconds      INR 0 97    Narrative:       INR:  ,PROTIME:      Lipase [[id number]]  (Normal) Collected:  09/09/18 1453    Lab Status:  Final result Specimen:  Blood Updated:  09/09/18 1459     Lipase 53 u/L     Comprehensive metabolic panel [[id number]]  (Abnormal) Collected:  09/09/18 1453    Lab Status:  Final result Specimen:  Blood Updated:  09/09/18 1459     Sodium 139 mmol/L      Potassium 3 4 (L) mmol/L      Chloride 97 mmol/L      CO2 28 mmol/L      ANION GAP 14 mmol/L      BUN 18 mg/dL      Creatinine 0 69 (L) mg/dL      Glucose 100 (H) mg/dL      Calcium 9 2 mg/dL      AST 53 U/L      ALT 58 (H) U/L      Alkaline Phosphatase 83 U/L      Total Protein 9 1 (H) g/dL      Albumin 5 0 g/dL      Total Bilirubin 0 40 mg/dL      eGFR 128 ml/min/1 73sq m     Narrative:         National Kidney Disease Education Program recommendations are as follows:  GFR calculation is accurate only with a steady state creatinine  Chronic Kidney disease less than 60 ml/min/1 73 sq  meters  Kidney failure less than 15 ml/min/1 73 sq  meters  CBC and differential [[id number]]  (Normal) Collected:  09/09/18 1439    Lab Status:  Final result Specimen:  Blood from Arm, Right Updated:  09/09/18 1452     WBC 8 80 Thousand/uL      RBC 5 37 Million/uL      Hemoglobin 16 6 g/dL      Hematocrit 48 1 %      MCV 90 fL      MCH 31 0 pg      MCHC 34 6 g/dL      RDW 13 2 %      MPV 9 2 fL      Platelets 468 Thousands/uL      Neutrophils Relative 64 %      Lymphocytes Relative 27 %      Monocytes Relative 7 %      Eosinophils Relative 2 %      Basophils Relative 1 %      Neutrophils Absolute 5 60 Thousands/µL      Lymphocytes Absolute 2 30 Thousands/µL      Monocytes Absolute 0 60 Thousand/µL      Eosinophils Absolute 0 20 Thousand/µL      Basophils Absolute 0 10 Thousands/µL                  US abdomen limited   Final Result by Liset Salazar MD (09/09 1725)      Fatty infiltration of the liver  Cholelithiasis without signs of cholecystitis              Workstation performed: [id number]                    Procedures  Procedures       Phone Contacts  ED Phone Contact    ED Course  ED Course as of Sep 15 0717   Debby Ort Sep 09, 2018   6698 Discussed case with Dr Bharati Arredondo and signed over care of patient to him  1640 EKG:   Sinus tach 110 per; no acute ischemia noted    1640 The EKG was interpreted by me at 1359                                [facility]  Number of Diagnoses or Management Options  Epigastric abdominal pain:   Right cervical radiculopathy:      Amount and/or Complexity of Data Reviewed  Clinical lab tests: ordered  Tests in the radiology section of CPT®: ordered  Tests in the medicine section of CPT®: ordered and reviewed    Patient Progress  Patient progress: stable    CritCare Time    Disposition  Final diagnoses:   Epigastric abdominal pain   Right cervical radiculopathy   Calculus of gallbladder without cholecystitis without obstruction     Time reflects when diagnosis was documented in both MDM as applicable and the Disposition within this note     Time User Action Codes Description Comment    9/9/2018  4:07 PM Anneliese Hillman Add [R10 13] Epigastric abdominal pain     9/9/2018  4:08 PM Anneliese Hillman Add [M54 12] Right cervical radiculopathy     9/9/2018  5:48 PM Rosaura Ha Add [K80 20] Calculus of gallbladder without cholecystitis without obstruction       ED Disposition     ED Disposition Condition Comment    Discharge  [address] discharge to home/self care      Condition at discharge: Stable        Follow-up Information     Follow up With Specialties Details Why Contact Info    Breanna Balderrama MD Family Medicine In 1 day  [address] [facility] [address]  [address] [facility]  [address]  [address] [place] [address]            Discharge Medication List as of 9/9/2018  5:48 PM      CONTINUE these medications which have NOT CHANGED    Details   Olmesartan-Amlodipine-HCTZ (TRIBENZOR) 40-10-25 MG TABS Take 1 tablet by mouth daily, Starting Fri 8/24/2018, Normal      traMADol-acetaminophen (ULTRACET) 37 5-325 mg per tablet Take 1 tablet by mouth every 8 (eight) hours as needed for moderate pain, Starting Fri 8/24/2018, Normal           No discharge procedures on file      ED Provider  Electronically Signed by           Rajni Ott MD  09/15/18 8549

## 2018-09-09 NOTE — ED NOTES
Pt changed into gown and made comfortable in room  Pt placed on monitor for continuous cardiac monitoring       Chi Tafoya  09/09/18 0245

## 2018-09-09 NOTE — ED CARE HANDOFF
1017 PM  50-year-old male presents emergency department with noted right upper abdomen and right back pain  Patient was evaluated here in the emergency department with cardiac workup as well as ultrasound performed which shows cholelithiasis but no evidence of cholecystitis or biliary obstruction  Here in the emergency department the patient was talked to an and planned plan outpatient management for discharge  Pain is controlled at this point time plan outpatient management followup given strict instructions when to return back to emergency department  Patient agrees with the plan for outpatient management and follow-up  Pt re-examined and evaluated after testing and treatment  Spoke with the patient and feeling improved and sxs have resolved  Will discharge home with close f/u with pcp and instructed to return to the ED if sxs worsen or continue  Pt agrees with the plan for discharge and feels comfortable to go home with proper f/u  Advised to return for worsening or additional problems  Diagnostic tests were reviewed and questions answered  Diagnosis, care plan and treatment options were discussed  The patient understand instructions and will follow up as directed           Sharmila Friedman DO  09/09/18 1205

## 2018-09-09 NOTE — DISCHARGE INSTRUCTIONS
Radiculopatía cervical   LO QUE NECESITA SABER:   La radiculopatía cervical es samuel condición muy dolorosa que sucede cuando se oprime o irrita collin de los nervios de la columna vertebral    INSTRUCCIONES SOBRE EL PRANAV HOSPITALARIA:   Medicamentos:  Es posible que usted necesite alguno de los siguientes:  · AINEs (Analgésicos antiinflamatorios no esteroides)  ayudan a disminuir la inflamación y el dolor  Valentine medicamento puede comprarse sin receta médica  Valentine medicamento puede causar sangrado estomacal o problemas en los riñones en ciertas personas  Si usted tanya un medicamento anticoagulante, asegúrese de preguntarle a mendez médico si los ERICA son seguros para usted  Rosa siempre la etiqueta y siga cuidadosamente las instrucciones antes de usar valentine medicamento  · Un medicamento con receta para el dolor  ayudan a calmar el dolor  No espere hasta que el dolor sea severo antes de janis valentine medicamento  · Esteroides  contribuyen a aliviar el dolor y bajar la inflamación  Estos podrían administrarse kaila píldora o kaila inyección en el abi  Es posible que usted necesite mas de 1 inyección si los síntomas no mejoran después del primer tratamiento  · [place] tyesha medicamentos kaila se le haya indicado  Consulte con mendez médico si usted anu que mendez medicamento no le está ayudando o si presenta efectos secundarios  Infórmele si es alérgico a algún medicamento  Mantenga samuel lista actualizada de los [place], las vitaminas y los productos herbales que tanya  Incluya los siguientes datos de los medicamentos: cantidad, frecuencia y motivo de administración  Traiga con usted la lista o los envases de la píldoras a tyesha citas de seguimiento  Lleve la lista de los medicamentos con usted en lolly de samuel emergencia  Programe samuel ac de control con mendez médico o especialista en columna kaila se lo indicaron:  Anote tyesha preguntas para que se acuerde de hacerlas daniella tyesha visitas     Fisioterapia:  Mendez médico podría recomendarle fisioterapia para elongar y [employer]  El fisioterapeuta puede enseñarle a mejorar la postura y la forma de sostener el abi  También le podría enseñar a estar activo de samuel forma sullivan y a evitar lesiones futuras  Además le puede ayudar a desarrollar un plan de ejercicios que sea seguro para la espalda y el abi  Cuidados personales:   · El hielo  ayuda a disminuir la inflamación y el dolor  El hielo también puede contribuir a evitar el daño de los tejidos  Use un paquete de hielo o ponga hielo molido dentro de [employer]  Cúbrala con samuel toalla y colóquela en el abi por 15 a 20 minutos cada hora o kaila se le indique  · Descanse  cuando sienta que es necesario  Empiece a hacer un poco más día a día  Regrese a tyesha actividades diarias kaila se le haya indicado  · Use un collarín suave  Es posible que le den un collarín suave para sostener el abi mientras duerme  Use el collarín solamente kaila se lo indiquen  · Realice estiramientos suaves y ejercicio regularmente  El médico podría recomendarle que aj ejercicios de elongación suaves para ayudar a disminuir la rigidez en el abi y brazo [place] se recupera  Después de controlar el dolor, usted se podría beneficiar de hacer ejercicio a diario  Pregúntele que clase de ejercicios son seguros para Rozetta Spike y el abi  · [address]  Un área de trabajo cómoda puede ayudarle a evitar tensión en el abi  Pregúntele a mendez empleador que realice samuel revisión ergonómica para revisar la posición del escritorio, silla, teléfono y computadora  Aj cualquier ajuste necesario para mendez comodidad  Comuníquese con mendez médico o especialista en columna si:   · Usted tiene fiebre  · Usted pierde peso sin proponérselo  · El dolor es peor, aún con el medicamento  · [address] se sienten más adormecidas que antes, o no [place] dedos del todo      · Usted tiene preguntas o inquietudes acerca de lynne condición o cuidado  © 2017 [address] [employer] Information is for End User's use only and may not be sold, redistributed or otherwise used for commercial purposes  All illustrations and images included in CareNotes® are the copyrighted property of A D A M , Inc  or Delon Euceda  Esta información es sólo para uso en educación  Lynne intención no es darle un consejo médico sobre enfermedades o tratamientos  Colsulte con lynne Melven Rimes farmacéutico antes de seguir cualquier régimen médico para saber si es seguro y efectivo para usted  Cervical Radiculopathy   WHAT YOU NEED TO KNOW:   Cervical radiculopathy is a painful condition that happens when a spinal nerve in your neck is pinched or irritated  DISCHARGE INSTRUCTIONS:   Medicines: You may need any of the following:  · NSAIDs  help decrease swelling and pain  This medicine can be bought without a doctor's order  This medicine can cause stomach bleeding or kidney problems in certain people  If you take blood thinner medicine, always ask your healthcare provider if NSAIDs are safe for you  Always read the medicine label and follow the directions on it before using this medicine  · Prescription pain medicine  helps decrease pain  Do not wait until the pain is severe before you take this medicine  · Steroids  help decrease pain and swelling  These may be given as a pill or as an injection in your neck  You may need more than 1 injection if your symptoms do not improve after the first treatment  · Take your medicine as directed  Contact your healthcare provider if you think your medicine is not helping or if you have side effects  Tell him of her if you are allergic to any medicine  Keep a list of the medicines, vitamins, and herbs you take  Include the amounts, and when and why you take them  Bring the list or the pill bottles to follow-up visits  Carry your medicine list with you in case of an emergency    Follow up with your healthcare provider or spine specialist as directed:  Write down your questions so you remember to ask them during your visits  Physical therapy:  Your healthcare provider may suggest physical therapy to stretch and strengthen your muscles  Your physical therapist can teach you how to improve your posture and the way you hold your neck  He may also teach you how to be safely active and avoid further injury  He can also help you develop an exercise program that is safe for your back and neck  Self-care:   · Ice  helps decrease swelling and pain  Ice may also help prevent tissue damage  Use an ice pack, or put crushed ice in a plastic bag  Cover it with a towel and place it on your neck for 15 to 20 minutes every hour or as directed  · Rest  when you feel it is needed  Slowly start to do more each day  Return to your daily activities as directed  · Wear a soft collar  You may be given a soft collar to support your neck while you sleep  Wear the soft collar only as directed  · Do light stretches and regular exercise  Your healthcare provider may suggest light stretches to help decrease stiffness in your neck and arm as you recover  After your pain is controlled, you may benefit from regular exercise  Ask what type of exercise is safe for your back and neck  · Review your work area  A comfortable work area can help prevent neck strain  Ask your employer for an ergonomic review to check the position of your desk, chair, phone, and computer  Make any necessary adjustments for your comfort  Contact your healthcare provider or spine specialist if:   · You have a fever  · You are losing weight without trying  · Your pain is worse, even with medicine  · One or both hands feel more numb than before, or you cannot move your fingers well  · You have questions or concerns about your condition or care    © 2017 Silvina[employer]ok Vu Information is for End User's use only and may not be sold, redistributed or otherwise used for commercial purposes  All illustrations and images included in CareNotes® are the copyrighted property of A D A M , Inc  or Delon Euceda  The above information is an  only  It is not intended as medical advice for individual conditions or treatments  Talk to your doctor, nurse or pharmacist before following any medical regimen to see if it is safe and effective for you  Abdominal Pain   WHAT YOU NEED TO KNOW:   Abdominal pain can be dull, achy, or sharp  You may have pain in one area of your abdomen, or in your entire abdomen  Your pain may be caused by a condition such as constipation, food sensitivity or poisoning, infection, or a blockage  Abdominal pain can also be from a hernia, appendicitis, or an ulcer  Liver, gallbladder, or kidney conditions can also cause abdominal pain  The cause of your abdominal pain may be unknown  DISCHARGE INSTRUCTIONS:   Return to the emergency department if:   · You have new chest pain or shortness of breath  · You have pulsing pain in your upper abdomen or lower back that suddenly becomes constant  · Your pain is in the right lower abdominal area and worsens with movement  · You have a fever over 100 4°F (38°C) or shaking chills  · You are vomiting and cannot keep food or liquids down  · Your pain does not improve or gets worse over the next 8 to 12 hours  · You see blood in your vomit or bowel movements, or they look black and tarry  · Your skin or the whites of your eyes turn yellow  · You are a woman and have a large amount of vaginal bleeding that is not your monthly period  Contact your healthcare provider if:   · You have pain in your lower back  · You are a man and have pain in your testicles  · You have pain when you urinate  · You have questions or concerns about your condition or care    Follow up with your healthcare provider within 24 hours or as directed:  Write down your questions so you remember to ask them during your visits  Medicines:   · Medicines  may be given to calm your stomach and prevent vomiting or to decrease pain  Ask how to take pain medicine safely  · Take your medicine as directed  Contact your healthcare provider if you think your medicine is not helping or if you have side effects  Tell him of her if you are allergic to any medicine  Keep a list of the medicines, vitamins, and herbs you take  Include the amounts, and when and why you take them  Bring the list or the pill bottles to follow-up visits  Carry your medicine list with you in case of an emergency  © 2017 [address] [employer] Information is for End User's use only and may not be sold, redistributed or otherwise used for commercial purposes  All illustrations and images included in CareNotes® are the copyrighted property of A D A M , Inc  or Delon Euceda  The above information is an  only  It is not intended as medical advice for individual conditions or treatments  Talk to your doctor, nurse or pharmacist before following any medical regimen to see if it is safe and effective for you  Dolor abdominal   LO QUE NECESITA SABER:   El dolor abdominal puede ser sordo, [place], o lorin  Usted puede sentir dolor localizado en samuel clyde área del abdomen o en todo el abdomen  El dolor puede ser causado por samuel afección kaila estreñimiento, sensibilidad o intoxicación alimentaria, infección o samuel obstrucción  Asimismo, el dolor abdominal puede deberse a samuel hernia, apendicitis o [place]  Las enfermedades del hígado, la vesícula o el riñón también pueden causar dolor abdominal  La causa del dolor abdominal puede ser desconocida  INSTRUCCIONES SOBRE EL PRANAV HOSPITALARIA:   Regrese a la armani de emergencias si:   · Usted comienza a sentir un dolor en el pecho o dificultad para respirar que antes no sentía      · Usted siente un dolor con pulsaciones en la parte superior del abdomen o en la parte inferior de la espalda que de repente se vuelve tanvi  · El dolor se localiza en la parte inferior derecha del abdomen y [place] cuando se [place]  · Usted tiene fiebre por encima de los 100 4 °F (38 °C) o escalofríos  · Usted tiene vómitos y no puede retener líquidos ni alimentos en el estómago  · El dolor no mejora o empeora en las próximas 8 a 12 horas  · Usted nota corey en mendez vómito o heces, o éstas tienen un aspecto negruzco y alquitranado  · Mendez piel o las partes ja de tyesha ojos se vuelven amarillentas  · Si usted es samuel barbara y presenta abundante sangrado vaginal que no es mendez menstruación  Pregúntele a mendez Emmanuel Pedro vitaminas y minerales son adecuados para usted  · Usted siente dolor en la parte inferior de la espalda  · Usted es [place]ra [place] y tiene dolor en los testículos  · Siente dolor al [place]  · Usted tiene preguntas o inquietudes acerca de mendez condición o cuidado  Acuda en 24 horas a samuel ac de seguimiento con mendez médico o kaila se le indique:  Anote tyesha preguntas para que se acuerde de hacerlas daniella tyesha visitas  Medicamentos:   · Medicamentos,  pueden ser administrados para calmar mendez estómago y prevenir los vómitos o para disminuir el dolor  Pregunte cómo se debe janis los analgésicos de forma sullivan  · [place] tyesha medicamentos kaila se le haya indicado  Consulte con mendez médico si usted anu que mendez medicamento no le está ayudando o si presenta efectos secundarios  Infórmele si es alérgico a algún medicamento  Mantenga samuel lista actualizada de los [place], las vitaminas y los productos herbales que tanya  Incluya los siguientes datos de los medicamentos: cantidad, frecuencia y motivo de administración  Traiga con usted la lista o los envases de la píldoras a tyesha citas de seguimiento  Lleve la lista de los medicamentos con usted en lolly de samuel emergencia    © 2017 [address] [employer] Information is for End User's use only and may not be sold, redistributed or otherwise used for commercial purposes  All illustrations and images included in CareNotes® are the copyrighted property of A D A M , Inc  or Delon Euceda  Esta información es sólo para uso en educación  Mendez intención no es darle un consejo médico sobre enfermedades o tratamientos  Colsulte con mendez David Guess farmacéutico antes de seguir cualquier régimen médico para saber si es seguro y efectivo para usted

## 2018-09-10 LAB
ATRIAL RATE: 110 BPM
P AXIS: 62 DEGREES
PR INTERVAL: 142 MS
QRS AXIS: 14 DEGREES
QRSD INTERVAL: 84 MS
QT INTERVAL: 336 MS
QTC INTERVAL: 454 MS
T WAVE AXIS: 31 DEGREES
VENTRICULAR RATE: 110 BPM

## 2018-09-10 PROCEDURE — 93010 ELECTROCARDIOGRAM REPORT: CPT | Performed by: INTERNAL MEDICINE

## 2018-09-21 ENCOUNTER — OFFICE VISIT (OUTPATIENT)
Dept: FAMILY MEDICINE CLINIC | Facility: CLINIC | Age: 28
End: 2018-09-21
Payer: COMMERCIAL

## 2018-09-21 VITALS
WEIGHT: 239 LBS | TEMPERATURE: 98 F | OXYGEN SATURATION: 98 % | RESPIRATION RATE: 16 BRPM | DIASTOLIC BLOOD PRESSURE: 70 MMHG | HEART RATE: 92 BPM | HEIGHT: 69 IN | SYSTOLIC BLOOD PRESSURE: 120 MMHG | BODY MASS INDEX: 35.4 KG/M2

## 2018-09-21 DIAGNOSIS — I10 ESSENTIAL HYPERTENSION: ICD-10-CM

## 2018-09-21 DIAGNOSIS — N50.811 RIGHT TESTICULAR PAIN: ICD-10-CM

## 2018-09-21 DIAGNOSIS — K80.20 GALLSTONES: Primary | ICD-10-CM

## 2018-09-21 PROCEDURE — 3078F DIAST BP <80 MM HG: CPT | Performed by: FAMILY MEDICINE

## 2018-09-21 PROCEDURE — 3074F SYST BP LT 130 MM HG: CPT | Performed by: FAMILY MEDICINE

## 2018-09-21 PROCEDURE — 3008F BODY MASS INDEX DOCD: CPT | Performed by: FAMILY MEDICINE

## 2018-09-21 PROCEDURE — 99214 OFFICE O/P EST MOD 30 MIN: CPT | Performed by: FAMILY MEDICINE

## 2018-09-21 RX ORDER — AMLODIPINE AND OLMESARTAN MEDOXOMIL 10; 40 MG/1; MG/1
1 TABLET ORAL DAILY
Qty: 30 TABLET | Refills: 5 | Status: SHIPPED | OUTPATIENT
Start: 2018-09-21

## 2018-09-21 NOTE — PATIENT INSTRUCTIONS
Cálculos biliares   LO QUE NECESITA SABER:   Los cálculos biliares, que también se conocen kaila la colelitiasis, son sustancias duras que se lemuel en mendez vesícula biliar o conducto colédoco  Mendez vesícula biliar y conducto colédoco se encuentran en la parte derecha del abdomen, cerca de mendez hígado  Mendez vesícula biliar almacena la bilis, la cual ayuda a descomponer la grasa que usted consume  Mendez vesícula biliar también ayuda a eliminar ciertos químicos de mendez cuerpo  INSTRUCCIONES SOBRE EL PRANAV HOSPITALARIA:   Medicamentos:   · Un medicamento con receta para el dolor  podrían ser Carlos Clutter  Pregunte al médico cómo debe janis valentine medicamento de forma sullivan  · [place] tyesha medicamentos kaila se le haya indicado  Consulte con mendez médico si usted anu que mendez medicamento no le está ayudando o si presenta efectos secundarios  Infórmele si es alérgico a cualquier medicamento  Mantenga samuel lista actualizada de los [place], las vitaminas y los productos herbales que tanya  Incluya los siguientes datos de los medicamentos: cantidad, frecuencia y motivo de administración  Traiga con usted la lista o los envases de la píldoras a tyesha citas de seguimiento  Lleve la lista de los medicamentos con usted en lolly de samuel emergencia  Acuda a tyesha consultas de control con mendez médico según le indicaron  Anote tyesha preguntas para que se acuerde de hacerlas daniella tyesha visitas  Consuma alimentos saludables y variados:  [place] podría ayudar a que usted tenga más energía y se recupere más pronto  Los alimentos saludables incluyen fruta, vegetales, panes integrales, productos lácteos bajo en grasa, frijoles, manas sin grasa, y pescado  Pregunte si necesita seguir samuel dieta especial   Ejercítese según indicaciones:  Consulte con mendez médico acerca de cuál es el mejor régimen de ejercicio para usted  El ejercicio puede ayudar a que usted baje de peso y [employer]     Pregúntele a mendez Berneice Penta vitaminas y minerales son adecuados para usted    · Usted tiene náuseas y vómitos  · Mendez orina está oscura  · Usted tiene evacuaciones intestinales del color de arcilla  · Usted tiene preguntas o inquietudes acerca de mendez condición o cuidado  [place] atención médica de inmediato o llame al 911 si:   · Usted tiene fiebre o escalofríos  · Mendez piel o tyesha ojos se tornan amarillentos  · Usted tiene dolor intenso en la parte superior de mendez abdomen, melvin debajo de mendez caja torácica  © 2017 [address] [employer] Vu Information is for End User's use only and may not be sold, redistributed or otherwise used for commercial purposes  All illustrations and images included in CareNotes® are the copyrighted property of A D A M , Inc  or Delon Euceda  Esta información es sólo para uso en educación  Mendez intención no es darle un consejo médico sobre enfermedades o tratamientos  Colsulte con mendez Jing Kind farmacéutico antes de seguir cualquier régimen médico para saber si es seguro y efectivo para usted

## 2018-09-21 NOTE — PROGRESS NOTES
Assessment/Plan:  1  Gallstones  Explained the needs for elective gallbladder remove, since gallstone might cause complication as pancreatitis  - Ambulatory referral to General Surgery; Future    2  Essential hypertension  Well controlled, but getting erectile dysfunction  I am removing the HCTZ, since is known the cause of ED in patients  - amlodipine-olmesartan (YONY) 10-40 MG; Take 1 tablet by mouth daily  Dispense: 30 tablet; Refill: 5    3  Right testicular pain  Last US did not show any major cause  The choice of NSAID's in this patient depends of her current pain syndrome  I explained the patient that the response of NSAIDs varies between patient's and also is different in regarding to the area of the body affected in the progression of the damage  The drug interactions and comorbidities affecting by these medications were explained to patient also; the caution in toxicity in the GI tract was also discussed  Prevent the long-term use of these medications may be wise  The use ice and physical therapy is necessary in order to get the best results  - diclofenac sodium (VOLTAREN) 50 mg EC tablet; Take 1 tablet (50 mg total) by mouth 2 (two) times a day  Dispense: 30 tablet; Refill: 0    No problem-specific Assessment & Plan notes found for this encounter  There are no diagnoses linked to this encounter  Subjective:      Patient ID: Elisha Sherman is a 33 y o  male  PT here for followup post emergency room  PT continues with right abdominal pain  The following portions of the patient's history were reviewed and updated as appropriate: allergies, current medications, past family history, past medical history, past social history, past surgical history and problem list     Review of Systems   Constitutional: Negative for activity change, appetite change, fatigue and fever  HENT: Negative for congestion, dental problem, ear pain, sinus pain and trouble swallowing  Eyes: Negative for discharge, redness and itching  Respiratory: Negative for cough, shortness of breath and wheezing  Cardiovascular: Negative for chest pain  Gastrointestinal: Positive for abdominal pain  Negative for abdominal distention  Genitourinary: Positive for testicular pain (left testicle  US did not show abnormalities except trace fluid  )  Negative for difficulty urinating, dysuria and enuresis  Musculoskeletal: Negative for arthralgias, back pain and gait problem  Neurological: Negative for dizziness and headaches  Hematological: Negative for adenopathy  Does not bruise/bleed easily  Psychiatric/Behavioral: Negative for behavioral problems  Objective:      /70 (BP Location: Left arm, Patient Position: Sitting, Cuff Size: Standard)   Pulse 92   Temp 98 °F (36 7 °C) (Oral)   Resp 16   Ht 5' 9" (1 753 m)   Wt 108 kg (239 lb)   SpO2 98%   BMI 35 29 kg/m²          Physical Exam   Constitutional: He is oriented to person, place, and time  He appears well-developed  HENT:   Head: Normocephalic  Eyes: Pupils are equal, round, and reactive to light  Neck: Normal range of motion  Cardiovascular: Normal rate  Pulmonary/Chest: Effort normal and breath sounds normal    Abdominal: Soft  There is no tenderness  Genitourinary: Penis normal    Musculoskeletal: Normal range of motion  Neurological: He is alert and oriented to person, place, and time  Skin: Skin is warm and dry  Psychiatric: He has a normal mood and affect   His behavior is normal  Judgment and thought content normal

## 2018-10-26 ENCOUNTER — APPOINTMENT (OUTPATIENT)
Dept: PREADMISSION TESTING | Facility: HOSPITAL | Age: 28
End: 2018-10-26
Payer: COMMERCIAL

## 2018-10-26 DIAGNOSIS — Z01.818 PREOP TESTING: Primary | ICD-10-CM

## 2018-10-26 LAB
ALBUMIN SERPL BCP-MCNC: 4.6 G/DL (ref 3–5.2)
ALP SERPL-CCNC: 83 U/L (ref 43–122)
ALT SERPL W P-5'-P-CCNC: 57 U/L (ref 9–52)
ANION GAP SERPL CALCULATED.3IONS-SCNC: 12 MMOL/L (ref 5–14)
AST SERPL W P-5'-P-CCNC: 48 U/L (ref 17–59)
BASOPHILS # BLD AUTO: 0.1 THOUSANDS/ΜL (ref 0–0.1)
BASOPHILS NFR BLD AUTO: 1 % (ref 0–1)
BILIRUB SERPL-MCNC: 0.6 MG/DL
BUN SERPL-MCNC: 16 MG/DL (ref 5–25)
CALCIUM SERPL-MCNC: 9.5 MG/DL (ref 8.4–10.2)
CHLORIDE SERPL-SCNC: 101 MMOL/L (ref 97–108)
CO2 SERPL-SCNC: 27 MMOL/L (ref 22–30)
CREAT SERPL-MCNC: 0.68 MG/DL (ref 0.7–1.5)
EOSINOPHIL # BLD AUTO: 0.3 THOUSAND/ΜL (ref 0–0.4)
EOSINOPHIL NFR BLD AUTO: 3 % (ref 0–6)
ERYTHROCYTE [DISTWIDTH] IN BLOOD BY AUTOMATED COUNT: 14 %
GFR SERPL CREATININE-BSD FRML MDRD: 129 ML/MIN/1.73SQ M
GLUCOSE SERPL-MCNC: 120 MG/DL (ref 70–99)
HCT VFR BLD AUTO: 43 % (ref 41–53)
HGB BLD-MCNC: 14.9 G/DL (ref 13.5–17.5)
LYMPHOCYTES # BLD AUTO: 2.9 THOUSANDS/ΜL (ref 0.5–4)
LYMPHOCYTES NFR BLD AUTO: 36 % (ref 20–50)
MCH RBC QN AUTO: 31.7 PG (ref 26–34)
MCHC RBC AUTO-ENTMCNC: 34.7 G/DL (ref 31–36)
MCV RBC AUTO: 91 FL (ref 80–100)
MONOCYTES # BLD AUTO: 0.6 THOUSAND/ΜL (ref 0.2–0.9)
MONOCYTES NFR BLD AUTO: 8 % (ref 1–10)
NEUTROPHILS # BLD AUTO: 4 THOUSANDS/ΜL (ref 1.8–7.8)
NEUTS SEG NFR BLD AUTO: 51 % (ref 45–65)
PLATELET # BLD AUTO: 285 THOUSANDS/UL (ref 150–450)
PMV BLD AUTO: 8.4 FL (ref 8.9–12.7)
POTASSIUM SERPL-SCNC: 4.3 MMOL/L (ref 3.6–5)
PROT SERPL-MCNC: 7.7 G/DL (ref 5.9–8.4)
RBC # BLD AUTO: 4.71 MILLION/UL (ref 4.5–5.9)
SODIUM SERPL-SCNC: 140 MMOL/L (ref 137–147)
WBC # BLD AUTO: 7.9 THOUSAND/UL (ref 4.5–11)

## 2018-10-26 PROCEDURE — 80053 COMPREHEN METABOLIC PANEL: CPT

## 2018-10-26 PROCEDURE — 85025 COMPLETE CBC W/AUTO DIFF WBC: CPT

## 2018-10-26 PROCEDURE — 93005 ELECTROCARDIOGRAM TRACING: CPT

## 2018-10-26 NOTE — PRE-PROCEDURE INSTRUCTIONS
Pre-Surgery Instructions:   Medication Instructions    amlodipine-olmesartan (YONY) 10-40 MG Instructed patient per Anesthesia Guidelines  Pre-op Showering Instructions for Surgery Patients    Before your operation, you play an important role in decreasing your risk for infection by washing with special antiseptic soap  This is an effective way to reduce bacteria on the skin which may help to prevent infections at the surgical site  Please read the following directions in advance  1  In the week before your operation, purchase a 4 ounce bottle of antiseptic soap containing chlorhexidine gluconate (CHG)  4%  Some brand names include: Aplicare®, Endure, and Hibiclens®  The cost is usually less than $5 00   For your convenience, the [employer] carries the soap   It may also be available at your doctors office or pre-admission testing center, and at most retail pharmacies   If you are allergic or sensitive to soaps containing CHG, please let your doctor know so another antiseptic can be suggested   CHG antiseptic soap is for external use only  2  The day before your operation, follow these instructions carefully to get ready   Please clean linens (sheets) on your bed; you should sleep on clean sheets after your evening shower   Get clean towels and washcloth ready - you need enough for 2 showers   Set aside clean underwear, pajamas, and clothing to wear after the showers     Reminders:   DO NOT use any other soap or body rinse on your skin during or after the antiseptic showers   DO NOT use lotion, powder, deodorant, or perfume/aftershave of any kind on your skin after your antiseptic shower   DO NOT shave any body parts in the 24 hours/day before your operation   DO NOT get the antiseptic soap in your eyes, ears, nose, mouth, or vaginal area    3   You will need to shower the night before AND the morning of your surgery  Shower 1:   The first evening before the operation, take the first shower   First, shampoo your hair with regular shampoo and rinse it completely before you use the antiseptic soap  After washing and rinsing your hair, rinse your body   Next, use a clean washcloth to apply the antiseptic soap and wash your body from the neck down to your toes using ½ bottle of the antiseptic soap   You will use the other ½ bottle for the second shower   Clean the area where your incision will be; lather this area well for about 2 minutes   If you are having head or neck surgery, wash areas with the antiseptic soap   Rinse yourself completely with running water   Use a clean towel to dry off   Wear clean underwear and clothing/pajamas  Shower 2   The morning of your operation, take the second shower following the same steps as Shower 1 using the second ½ of the bottle of antiseptic soap   Use clean cloths and towels to wash and dry yourself   Wear clean underwear and clothing

## 2018-10-27 LAB
ATRIAL RATE: 76 BPM
P AXIS: 43 DEGREES
PR INTERVAL: 140 MS
QRS AXIS: 14 DEGREES
QRSD INTERVAL: 94 MS
QT INTERVAL: 382 MS
QTC INTERVAL: 429 MS
T WAVE AXIS: 28 DEGREES
VENTRICULAR RATE: 76 BPM

## 2018-10-27 PROCEDURE — 93010 ELECTROCARDIOGRAM REPORT: CPT | Performed by: INTERNAL MEDICINE

## 2018-10-29 ENCOUNTER — ANESTHESIA EVENT (OUTPATIENT)
Dept: PERIOP | Facility: HOSPITAL | Age: 28
End: 2018-10-29
Payer: COMMERCIAL

## 2018-10-29 NOTE — ANESTHESIA PREPROCEDURE EVALUATION
Review of Systems/Medical History  Patient summary reviewed  Chart reviewed      Cardiovascular  Hypertension ,    Pulmonary  Negative pulmonary ROS        GI/Hepatic  Negative GI/hepatic ROS   Liver disease (fatty liver) ,        Negative  ROS        Endo/Other  Negative endo/other ROS   Obesity  morbid obesity   GYN       Hematology  Negative hematology ROS      Musculoskeletal  Negative musculoskeletal ROS        Neurology  Negative neurology ROS      Psychology   Negative psychology ROS                   Anesthesia Plan  ASA Score- 2     Anesthesia Type- general with ASA Monitors  Additional Monitors:   Airway Plan: ETT  Plan Factors-    Induction- intravenous  Postoperative Plan-     Informed Consent- Anesthetic plan and risks discussed with patient  I personally reviewed this patient with the CRNA  Discussed and agreed on the Anesthesia Plan with the CRNA  Angie Potts

## 2018-10-30 ENCOUNTER — HOSPITAL ENCOUNTER (OUTPATIENT)
Facility: HOSPITAL | Age: 28
Setting detail: OUTPATIENT SURGERY
Discharge: HOME | End: 2018-10-30
Attending: SURGERY | Admitting: SURGERY
Payer: COMMERCIAL

## 2018-10-30 ENCOUNTER — ANESTHESIA (OUTPATIENT)
Dept: PERIOP | Facility: HOSPITAL | Age: 28
End: 2018-10-30
Payer: COMMERCIAL

## 2018-10-30 VITALS
SYSTOLIC BLOOD PRESSURE: 121 MMHG | DIASTOLIC BLOOD PRESSURE: 86 MMHG | WEIGHT: 246 LBS | TEMPERATURE: 99.8 F | HEART RATE: 90 BPM | OXYGEN SATURATION: 98 % | RESPIRATION RATE: 20 BRPM | BODY MASS INDEX: 36.43 KG/M2 | HEIGHT: 69 IN

## 2018-10-30 DIAGNOSIS — Z01.818 PREOP TESTING: ICD-10-CM

## 2018-10-30 DIAGNOSIS — K80.10 CALCULUS OF GALLBLADDER WITH CHRONIC CHOLECYSTITIS WITHOUT OBSTRUCTION: Primary | ICD-10-CM

## 2018-10-30 DIAGNOSIS — K81.0 ACUTE CHOLECYSTITIS: ICD-10-CM

## 2018-10-30 PROCEDURE — 88304 TISSUE EXAM BY PATHOLOGIST: CPT | Performed by: PATHOLOGY

## 2018-10-30 RX ORDER — ONDANSETRON 2 MG/ML
INJECTION INTRAMUSCULAR; INTRAVENOUS AS NEEDED
Status: DISCONTINUED | OUTPATIENT
Start: 2018-10-30 | End: 2018-10-30 | Stop reason: SURG

## 2018-10-30 RX ORDER — OXYCODONE HYDROCHLORIDE AND ACETAMINOPHEN 5; 325 MG/1; MG/1
1 TABLET ORAL EVERY 4 HOURS PRN
Qty: 30 TABLET | Refills: 0 | Status: SHIPPED | OUTPATIENT
Start: 2018-10-30 | End: 2018-11-09

## 2018-10-30 RX ORDER — PROPOFOL 10 MG/ML
INJECTION, EMULSION INTRAVENOUS AS NEEDED
Status: DISCONTINUED | OUTPATIENT
Start: 2018-10-30 | End: 2018-10-30 | Stop reason: SURG

## 2018-10-30 RX ORDER — MAGNESIUM HYDROXIDE 1200 MG/15ML
LIQUID ORAL AS NEEDED
Status: DISCONTINUED | OUTPATIENT
Start: 2018-10-30 | End: 2018-10-30 | Stop reason: HOSPADM

## 2018-10-30 RX ORDER — FENTANYL CITRATE 50 UG/ML
INJECTION, SOLUTION INTRAMUSCULAR; INTRAVENOUS AS NEEDED
Status: DISCONTINUED | OUTPATIENT
Start: 2018-10-30 | End: 2018-10-30 | Stop reason: SURG

## 2018-10-30 RX ORDER — OXYCODONE HYDROCHLORIDE AND ACETAMINOPHEN 5; 325 MG/1; MG/1
1 TABLET ORAL EVERY 4 HOURS PRN
Status: DISCONTINUED | OUTPATIENT
Start: 2018-10-30 | End: 2018-10-30 | Stop reason: HOSPADM

## 2018-10-30 RX ORDER — DIPHENHYDRAMINE HYDROCHLORIDE 50 MG/ML
12.5 INJECTION INTRAMUSCULAR; INTRAVENOUS ONCE AS NEEDED
Status: DISCONTINUED | OUTPATIENT
Start: 2018-10-30 | End: 2018-10-30 | Stop reason: HOSPADM

## 2018-10-30 RX ORDER — BUPIVACAINE HYDROCHLORIDE AND EPINEPHRINE 5; 5 MG/ML; UG/ML
INJECTION, SOLUTION PERINEURAL AS NEEDED
Status: DISCONTINUED | OUTPATIENT
Start: 2018-10-30 | End: 2018-10-30 | Stop reason: HOSPADM

## 2018-10-30 RX ORDER — MIDAZOLAM HYDROCHLORIDE 1 MG/ML
INJECTION INTRAMUSCULAR; INTRAVENOUS AS NEEDED
Status: DISCONTINUED | OUTPATIENT
Start: 2018-10-30 | End: 2018-10-30 | Stop reason: SURG

## 2018-10-30 RX ORDER — HYDROMORPHONE HCL/PF 1 MG/ML
0.5 SYRINGE (ML) INJECTION
Status: DISCONTINUED | OUTPATIENT
Start: 2018-10-30 | End: 2018-10-30 | Stop reason: HOSPADM

## 2018-10-30 RX ORDER — ROCURONIUM BROMIDE 10 MG/ML
INJECTION, SOLUTION INTRAVENOUS AS NEEDED
Status: DISCONTINUED | OUTPATIENT
Start: 2018-10-30 | End: 2018-10-30 | Stop reason: SURG

## 2018-10-30 RX ORDER — SODIUM CHLORIDE, SODIUM LACTATE, POTASSIUM CHLORIDE, CALCIUM CHLORIDE 600; 310; 30; 20 MG/100ML; MG/100ML; MG/100ML; MG/100ML
100 INJECTION, SOLUTION INTRAVENOUS CONTINUOUS
Status: DISCONTINUED | OUTPATIENT
Start: 2018-10-30 | End: 2018-10-30 | Stop reason: HOSPADM

## 2018-10-30 RX ORDER — FENTANYL CITRATE/PF 50 MCG/ML
25 SYRINGE (ML) INJECTION
Status: DISCONTINUED | OUTPATIENT
Start: 2018-10-30 | End: 2018-10-30 | Stop reason: HOSPADM

## 2018-10-30 RX ORDER — LIDOCAINE HYDROCHLORIDE 10 MG/ML
INJECTION, SOLUTION INFILTRATION; PERINEURAL AS NEEDED
Status: DISCONTINUED | OUTPATIENT
Start: 2018-10-30 | End: 2018-10-30 | Stop reason: SURG

## 2018-10-30 RX ORDER — GLYCOPYRROLATE 0.2 MG/ML
INJECTION INTRAMUSCULAR; INTRAVENOUS AS NEEDED
Status: DISCONTINUED | OUTPATIENT
Start: 2018-10-30 | End: 2018-10-30 | Stop reason: SURG

## 2018-10-30 RX ORDER — DEXAMETHASONE SODIUM PHOSPHATE 4 MG/ML
INJECTION, SOLUTION INTRA-ARTICULAR; INTRALESIONAL; INTRAMUSCULAR; INTRAVENOUS; SOFT TISSUE AS NEEDED
Status: DISCONTINUED | OUTPATIENT
Start: 2018-10-30 | End: 2018-10-30 | Stop reason: SURG

## 2018-10-30 RX ORDER — SODIUM CHLORIDE, SODIUM LACTATE, POTASSIUM CHLORIDE, CALCIUM CHLORIDE 600; 310; 30; 20 MG/100ML; MG/100ML; MG/100ML; MG/100ML
125 INJECTION, SOLUTION INTRAVENOUS CONTINUOUS
Status: DISCONTINUED | OUTPATIENT
Start: 2018-10-30 | End: 2018-10-30

## 2018-10-30 RX ORDER — OXYCODONE HYDROCHLORIDE AND ACETAMINOPHEN 5; 325 MG/1; MG/1
TABLET ORAL
Status: DISCONTINUED
Start: 2018-10-30 | End: 2018-10-30 | Stop reason: HOSPADM

## 2018-10-30 RX ADMIN — GLYCOPYRROLATE 0.4 MG: 0.2 INJECTION, SOLUTION INTRAMUSCULAR; INTRAVENOUS at 09:12

## 2018-10-30 RX ADMIN — FENTANYL CITRATE 25 MCG: 50 INJECTION INTRAMUSCULAR; INTRAVENOUS at 09:57

## 2018-10-30 RX ADMIN — OXYCODONE HYDROCHLORIDE AND ACETAMINOPHEN 1 TABLET: 5; 325 TABLET ORAL at 11:05

## 2018-10-30 RX ADMIN — HYDROMORPHONE HYDROCHLORIDE 0.5 MG: 1 INJECTION, SOLUTION INTRAMUSCULAR; INTRAVENOUS; SUBCUTANEOUS at 10:23

## 2018-10-30 RX ADMIN — LIDOCAINE HYDROCHLORIDE 50 MG: 10 INJECTION, SOLUTION INFILTRATION; PERINEURAL at 07:50

## 2018-10-30 RX ADMIN — OXYCODONE HYDROCHLORIDE AND ACETAMINOPHEN 1 TABLET: 5; 325 TABLET ORAL at 15:16

## 2018-10-30 RX ADMIN — DEXAMETHASONE SODIUM PHOSPHATE 8 MG: 4 INJECTION, SOLUTION INTRA-ARTICULAR; INTRALESIONAL; INTRAMUSCULAR; INTRAVENOUS; SOFT TISSUE at 08:08

## 2018-10-30 RX ADMIN — SODIUM CHLORIDE, POTASSIUM CHLORIDE, SODIUM LACTATE AND CALCIUM CHLORIDE: 600; 310; 30; 20 INJECTION, SOLUTION INTRAVENOUS at 08:47

## 2018-10-30 RX ADMIN — SODIUM CHLORIDE, POTASSIUM CHLORIDE, SODIUM LACTATE AND CALCIUM CHLORIDE 100 ML/HR: 600; 310; 30; 20 INJECTION, SOLUTION INTRAVENOUS at 07:22

## 2018-10-30 RX ADMIN — ROCURONIUM BROMIDE 10 MG: 10 INJECTION INTRAVENOUS at 08:44

## 2018-10-30 RX ADMIN — ONDANSETRON HYDROCHLORIDE 4 MG: 2 INJECTION, SOLUTION INTRAMUSCULAR; INTRAVENOUS at 09:03

## 2018-10-30 RX ADMIN — FENTANYL CITRATE 25 MCG: 50 INJECTION INTRAMUSCULAR; INTRAVENOUS at 10:09

## 2018-10-30 RX ADMIN — FENTANYL CITRATE 25 MCG: 50 INJECTION INTRAMUSCULAR; INTRAVENOUS at 09:40

## 2018-10-30 RX ADMIN — NEOSTIGMINE METHYLSULFATE 3 MG: 1 INJECTION, SOLUTION INTRAMUSCULAR; INTRAVENOUS; SUBCUTANEOUS at 09:12

## 2018-10-30 RX ADMIN — FENTANYL CITRATE 150 MCG: 50 INJECTION INTRAMUSCULAR; INTRAVENOUS at 08:11

## 2018-10-30 RX ADMIN — FENTANYL CITRATE 25 MCG: 50 INJECTION INTRAMUSCULAR; INTRAVENOUS at 09:52

## 2018-10-30 RX ADMIN — ROCURONIUM BROMIDE 70 MG: 10 INJECTION INTRAVENOUS at 07:51

## 2018-10-30 RX ADMIN — FENTANYL CITRATE 100 MCG: 50 INJECTION INTRAMUSCULAR; INTRAVENOUS at 07:50

## 2018-10-30 RX ADMIN — MIDAZOLAM HYDROCHLORIDE 2 MG: 1 INJECTION, SOLUTION INTRAMUSCULAR; INTRAVENOUS at 07:50

## 2018-10-30 RX ADMIN — CEFAZOLIN SODIUM 2000 MG: 2 SOLUTION INTRAVENOUS at 07:59

## 2018-10-30 RX ADMIN — PROPOFOL 200 MG: 10 INJECTION, EMULSION INTRAVENOUS at 07:50

## 2018-10-30 NOTE — NURSING NOTE
Patient ambulated around unit slowly with assistance  Patient did well despite some abdominal pain  Will continue to monitor patient and discharge him to home  Family remains at bedside

## 2018-10-30 NOTE — NURSING NOTE
Patient assisted to stand to void  Voided without difficulty  Having discomfort with movement  Support offered  Encouraged patient to deep breathe and try to move a bit more  Patient assisted back to bed

## 2018-10-30 NOTE — NURSING NOTE
Spoke with Dr Teresa Rice about patient's of going home  Reassured patient he would be ok and Dr Teresa Rice made aware of his progress

## 2018-10-30 NOTE — OP NOTE
OPERATIVE REPORT  PATIENT NAME: Jessica Dc    :  1990  MRN: [id number]  Pt Location:  OR ROOM 10    SURGERY DATE: 10/30/2018    Surgeon(s) and Role:     Cara Tamez DO - Primary    Preop Diagnosis:  Acute cholecystitis [K81 0]    Post-Op Diagnosis Codes:     * Acute cholecystitis [K81 0]    Procedure(s) (LRB):  LAPAROSCOPIC CHOLECYSTECTOMY (N/A)    Specimen(s):  ID Type Source Tests Collected by Time Destination   1 : gallbladder Tissue Gallbladder TISSUE EXAM Chiqui Wells DO 10/30/2018 0830        Estimated Blood Loss:   Minimal    Drains:       Anesthesia Type:   General    Operative Indications:  Acute cholecystitis [K81 0]  Cholelithiasis    Operative Findings:  Distended gallbladder filled with gallstones    Complications:   None    Procedure and Technique: With the patient in the Trendelenburg position prepped and draped in usual manner a curvilinear infraumbilical incision was made  Dissection was carried down the subcutaneous tissues small incision was made in the fascia and a Veress needle was inserted  A pneumoperitoneum was obtained  An 11 mm port was placed with the use of a visi port and a 2nd 11 mm port was placed in the subxiphoid position  Two 5 mm ports were placed in the right side of the patient's abdomen    Omentum was adhered to the gallbladder  This was dissected off the gallbladder using electrocautery  Bile was then aspirated from the gallbladder  Small yellow stones could be seen through the gallbladder wall  A photograph was taken  The patient was placed in reverse Trendelenburg position and the gallbladder was placed on tension  Cystic artery and cystic duct were carefully dissected out in the triangle of Calot each was doubly clipped and cut  The gallbladder was removed from the gallbladder fossa in a retrograde fashion using electrocautery to provide hemostasis and also to seal off bile canaliculi    The gallbladder was moved to the subxiphoid incision  It was saved and sent to pathology lab for tissue diagnosis  The subxiphoid incision was then closed with a endo close suture of 0 Vicryl  The pneumoperitoneum was evacuated hemostasis was adequate  The fascia of the infra umbilical incision was closed with a figure-of-eight suture of 3 0 Prolene the skin of all 4 incisions were closed with interrupted vertical mattress sutures of 3 0 nylon and skin staples  The patient tolerated the procedure well and he was taken to PACU in stable condition     I was present for the entire procedure    Patient Disposition:  PACU  and hemodynamically stable    SIGNATURE: Cecilia Tidwell DO  DATE: October 30, 2018  TIME: 9:14 AM

## 2018-10-30 NOTE — ANESTHESIA POSTPROCEDURE EVALUATION
Post-Op Assessment Note      CV Status:  Stable    Mental Status:  Agitated    Hydration Status:  Stable    PONV Controlled:  Controlled    Airway Patency:  Patent    Post Op Vitals Reviewed: Yes          Staff: CRNA           BP  114/77   Temp      Pulse 78   Resp   26   SpO2   93

## 2018-10-30 NOTE — DISCHARGE INSTRUCTIONS
Laparoscopic Cholecystectomy   WHAT YOU NEED TO KNOW:   Laparoscopic cholecystectomy is surgery to remove your gallbladder  DISCHARGE INSTRUCTIONS:   Medicines: You may need any of the following:  · Prescription pain medicine  helps decrease pain  Do not wait until the pain is severe before you take this medicine  · NSAIDs  decrease swelling and pain  This medicine can be bought with or without a doctor's order  This medicine can cause stomach bleeding or kidney problems in certain people  If you take blood thinner medicine, always ask your healthcare provider if NSAIDs are safe for you  Read the medicine label and follow the directions on it before using this medicine  · Take your medicine as directed  Contact your healthcare provider if you think your medicine is not helping or if you have side effects  Tell him or her if you are allergic to any medicine  Keep a list of the medicines, vitamins, and herbs you take  Include the amounts, and when and why you take them  Bring the list or the pill bottles to follow-up visits  Carry your medicine list with you in case of an emergency  Follow up with your healthcare provider 2 weeks after surgery, or as directed:  Write down your questions so you remember to ask them during your visits  Wound care:  Care for your surgical wounds as directed  Keep the wounds clean and dry  You may take a shower the day after your surgery  What to eat after surgery:  Eat low-fat foods for 4 to 6 weeks while your body learns to digest fat without a gallbladder  Slowly increase the amount of fat that you eat  Drink plenty of liquids  Ask how much liquid to drink and which liquids are best for you  When to return to work and other activities: You may return to work or other activities as soon as your pain is controlled and you feel comfortable  For many people, this is 5 to 7 days after surgery     Contact your healthcare provider if:   · You have a fever over 101°F (38°C) or chills  · You have pain or nausea that is not relieved by medicine  · You have redness and swelling around your incisions, or blood or pus is leaking from your incisions  · You are constipated or have diarrhea  · Your skin or eyes are yellow, or your bowel movements are pale  · You have questions or concerns about your surgery, condition, or care  Seek care immediately or call 911 if:   · You cannot stop vomiting  · Your bowel movements are black or bloody  · You have pain in your abdomen and it is swollen or hard  · Your arm or leg feels warm, tender, and painful  It may look swollen and red  · You feel lightheaded, short of breath, and have chest pain  · You cough up blood  © 2017 [employer] Information is for End User's use only and may not be sold, redistributed or otherwise used for commercial purposes  All illustrations and images included in CareNotes® are the copyrighted property of A [employer] M [employer]  or [employer]  The above information is an  only  It is not intended as medical advice for individual conditions or treatments  Talk to your doctor, nurse or pharmacist before following any medical regimen to see if it is safe and effective for you  No lifting, no driving, may shower  Take one Percocet every 4 hours for pain  See Dr Sheela Hong in one week  Call [phone]  For a time

## 2018-10-30 NOTE — NURSING NOTE
Pt returned to [facility] awake and alert IV infusing  Abdominal dressings are clean and dry, abdomen soft  Pt C/O 7/10 operative pain  Analgesic given as ordered  Taking clear liquid  Family at bedside

## 2018-11-08 ENCOUNTER — APPOINTMENT (EMERGENCY)
Dept: ULTRASOUND IMAGING | Facility: HOSPITAL | Age: 28
End: 2018-11-08
Payer: COMMERCIAL

## 2018-11-08 ENCOUNTER — APPOINTMENT (EMERGENCY)
Dept: CT IMAGING | Facility: HOSPITAL | Age: 28
End: 2018-11-08
Payer: COMMERCIAL

## 2018-11-08 ENCOUNTER — HOSPITAL ENCOUNTER (EMERGENCY)
Facility: HOSPITAL | Age: 28
Discharge: HOME | End: 2018-11-08
Attending: EMERGENCY MEDICINE | Admitting: EMERGENCY MEDICINE
Payer: COMMERCIAL

## 2018-11-08 VITALS
WEIGHT: 25.35 LBS | TEMPERATURE: 98.7 F | DIASTOLIC BLOOD PRESSURE: 72 MMHG | HEART RATE: 90 BPM | RESPIRATION RATE: 16 BRPM | SYSTOLIC BLOOD PRESSURE: 131 MMHG | BODY MASS INDEX: 3.74 KG/M2 | OXYGEN SATURATION: 98 %

## 2018-11-08 DIAGNOSIS — G89.18 POST-OPERATIVE PAIN: Primary | ICD-10-CM

## 2018-11-08 LAB
ALBUMIN SERPL BCP-MCNC: 4.7 G/DL (ref 3–5.2)
ALP SERPL-CCNC: 88 U/L (ref 43–122)
ALT SERPL W P-5'-P-CCNC: 55 U/L (ref 9–52)
ANION GAP SERPL CALCULATED.3IONS-SCNC: 8 MMOL/L (ref 5–14)
AST SERPL W P-5'-P-CCNC: 32 U/L (ref 17–59)
ATRIAL RATE: 87 BPM
BASOPHILS # BLD AUTO: 0.1 THOUSANDS/ΜL (ref 0–0.1)
BASOPHILS NFR BLD AUTO: 1 % (ref 0–1)
BILIRUB SERPL-MCNC: 0.3 MG/DL
BILIRUB UR QL STRIP: NEGATIVE
BUN SERPL-MCNC: 16 MG/DL (ref 5–25)
CALCIUM SERPL-MCNC: 9.3 MG/DL (ref 8.4–10.2)
CHLORIDE SERPL-SCNC: 101 MMOL/L (ref 97–108)
CLARITY UR: CLEAR
CO2 SERPL-SCNC: 30 MMOL/L (ref 22–30)
COLOR UR: NORMAL
CREAT SERPL-MCNC: 0.74 MG/DL (ref 0.7–1.5)
EOSINOPHIL # BLD AUTO: 0.2 THOUSAND/ΜL (ref 0–0.4)
EOSINOPHIL NFR BLD AUTO: 3 % (ref 0–6)
ERYTHROCYTE [DISTWIDTH] IN BLOOD BY AUTOMATED COUNT: 13.4 %
GFR SERPL CREATININE-BSD FRML MDRD: 125 ML/MIN/1.73SQ M
GLUCOSE SERPL-MCNC: 98 MG/DL (ref 70–99)
GLUCOSE UR STRIP-MCNC: NEGATIVE MG/DL
HCT VFR BLD AUTO: 43.1 % (ref 41–53)
HGB BLD-MCNC: 14.9 G/DL (ref 13.5–17.5)
HGB UR QL STRIP.AUTO: NEGATIVE
KETONES UR STRIP-MCNC: NEGATIVE MG/DL
LEUKOCYTE ESTERASE UR QL STRIP: NEGATIVE
LIPASE SERPL-CCNC: 53 U/L (ref 23–300)
LYMPHOCYTES # BLD AUTO: 2 THOUSANDS/ΜL (ref 0.5–4)
LYMPHOCYTES NFR BLD AUTO: 24 % (ref 20–50)
MCH RBC QN AUTO: 31.7 PG (ref 26–34)
MCHC RBC AUTO-ENTMCNC: 34.6 G/DL (ref 31–36)
MCV RBC AUTO: 92 FL (ref 80–100)
MONOCYTES # BLD AUTO: 0.6 THOUSAND/ΜL (ref 0.2–0.9)
MONOCYTES NFR BLD AUTO: 7 % (ref 1–10)
NEUTROPHILS # BLD AUTO: 5.3 THOUSANDS/ΜL (ref 1.8–7.8)
NEUTS SEG NFR BLD AUTO: 65 % (ref 45–65)
NITRITE UR QL STRIP: NEGATIVE
P AXIS: 53 DEGREES
PH UR STRIP.AUTO: 8 [PH] (ref 4.5–8)
PLATELET # BLD AUTO: 345 THOUSANDS/UL (ref 150–450)
PMV BLD AUTO: 8.3 FL (ref 8.9–12.7)
POTASSIUM SERPL-SCNC: 4.1 MMOL/L (ref 3.6–5)
PR INTERVAL: 148 MS
PROT SERPL-MCNC: 8.1 G/DL (ref 5.9–8.4)
PROT UR STRIP-MCNC: NEGATIVE MG/DL
QRS AXIS: 4 DEGREES
QRSD INTERVAL: 90 MS
QT INTERVAL: 362 MS
QTC INTERVAL: 435 MS
RBC # BLD AUTO: 4.69 MILLION/UL (ref 4.5–5.9)
SODIUM SERPL-SCNC: 139 MMOL/L (ref 137–147)
SP GR UR STRIP.AUTO: 1.01 (ref 1–1.04)
T WAVE AXIS: 25 DEGREES
UROBILINOGEN UA: NEGATIVE MG/DL
VENTRICULAR RATE: 87 BPM
WBC # BLD AUTO: 8.2 THOUSAND/UL (ref 4.5–11)

## 2018-11-08 PROCEDURE — 96361 HYDRATE IV INFUSION ADD-ON: CPT

## 2018-11-08 PROCEDURE — 76705 ECHO EXAM OF ABDOMEN: CPT

## 2018-11-08 PROCEDURE — 36415 COLL VENOUS BLD VENIPUNCTURE: CPT | Performed by: EMERGENCY MEDICINE

## 2018-11-08 PROCEDURE — 80053 COMPREHEN METABOLIC PANEL: CPT | Performed by: EMERGENCY MEDICINE

## 2018-11-08 PROCEDURE — 83690 ASSAY OF LIPASE: CPT | Performed by: EMERGENCY MEDICINE

## 2018-11-08 PROCEDURE — 93005 ELECTROCARDIOGRAM TRACING: CPT

## 2018-11-08 PROCEDURE — 96374 THER/PROPH/DIAG INJ IV PUSH: CPT

## 2018-11-08 PROCEDURE — 81003 URINALYSIS AUTO W/O SCOPE: CPT | Performed by: EMERGENCY MEDICINE

## 2018-11-08 PROCEDURE — 74177 CT ABD & PELVIS W/CONTRAST: CPT

## 2018-11-08 PROCEDURE — 71275 CT ANGIOGRAPHY CHEST: CPT

## 2018-11-08 PROCEDURE — 93010 ELECTROCARDIOGRAM REPORT: CPT | Performed by: INTERNAL MEDICINE

## 2018-11-08 PROCEDURE — 85025 COMPLETE CBC W/AUTO DIFF WBC: CPT | Performed by: EMERGENCY MEDICINE

## 2018-11-08 PROCEDURE — 99284 EMERGENCY DEPT VISIT MOD MDM: CPT

## 2018-11-08 RX ORDER — KETOROLAC TROMETHAMINE 30 MG/ML
15 INJECTION, SOLUTION INTRAMUSCULAR; INTRAVENOUS ONCE
Status: COMPLETED | OUTPATIENT
Start: 2018-11-08 | End: 2018-11-08

## 2018-11-08 RX ADMIN — IODIXANOL 80 ML: 320 INJECTION, SOLUTION INTRAVASCULAR at 14:44

## 2018-11-08 RX ADMIN — IOHEXOL 85 ML: 350 INJECTION, SOLUTION INTRAVENOUS at 14:43

## 2018-11-08 RX ADMIN — KETOROLAC TROMETHAMINE 15 MG: 30 INJECTION, SOLUTION INTRAMUSCULAR; INTRAVENOUS at 14:11

## 2018-11-08 RX ADMIN — SODIUM CHLORIDE 1000 ML: 9 INJECTION, SOLUTION INTRAVENOUS at 13:59

## 2018-11-08 NOTE — ED PROVIDER NOTES
Pt Name: Kodi Deleon  MRN: [id number]  [place] 1990  Age/Sex: 33 y o  male  Date of evaluation: 11/8/2018  PCP: Devona Sacks, MD    CHIEF COMPLAINT    Chief Complaint   Patient presents with    Surgical Problem Re-Evaluation     Pt encouraged to come to ER by Dr Enoc Bernard d/t pain in RUQ  Pt had cholecystectomy one week ago  Pt denies fever/chills  HPI    Presley Worley presents to the Emergency Department complaining of right sided pain  He is 10 days post op from cholecystectomy and was seen in the office by Enoc Garner today for follow up  He complained of pain with deep breathing and laterally  History provided by:  Patient        Past Medical and Surgical History    Past Medical History:   Diagnosis Date    Fatty liver 09/2018    per ct abdomen    Hypertension     Obesity        Past Surgical History:   Procedure Laterality Date    NO PAST SURGERIES      [place] LAP,CHOLECYSTECTOMY N/A 10/30/2018    Procedure: LAPAROSCOPIC CHOLECYSTECTOMY;  Surgeon: Harrold Buerger, DO;  [facility] MAIN OR;  Service: General       Family History   Problem Relation Age of Onset    Hypertension Mother     Diabetes Mother     Diabetes Maternal Grandmother     Diabetes Maternal Grandfather     Lung cancer Family        Social History   Substance Use Topics    Smoking status: Never Smoker    Smokeless tobacco: Never Used    Alcohol use Yes      Comment: Social              Allergies    Allergies   Allergen Reactions    No Known Allergies        Home Medications    Prior to Admission medications    Medication Sig Start Date End Date Taking?  Authorizing Provider   amlodipine-olmesartan (YONY) 10-40 MG Take 1 tablet by mouth daily 9/21/18   Devona Sacks, MD   oxyCODONE-acetaminophen (PERCOCET) 5-325 mg per tablet Take 1 tablet by mouth every 4 (four) hours as needed for moderate pain for up to 10 days Max Daily Amount: 6 tablets 10/30/18 11/9/18  Harrold Buerger, DO           Review of Systems    Review of Systems   Constitutional: Negative for activity change, appetite change, chills, fatigue and fever  HENT: Negative for congestion, rhinorrhea, sinus pressure, sneezing, sore throat and trouble swallowing  Eyes: Negative for photophobia and visual disturbance  Respiratory: Negative for chest tightness, shortness of breath and wheezing  Cardiovascular: Negative for chest pain and leg swelling  Gastrointestinal: Positive for abdominal pain  Negative for abdominal distention, constipation, diarrhea, nausea and vomiting  Endocrine: Negative for polydipsia, polyphagia and polyuria  Genitourinary: Negative for decreased urine volume, difficulty urinating, dysuria, flank pain, frequency and urgency  Musculoskeletal: Negative for back pain, gait problem, joint swelling and neck pain  Skin: Negative for color change, pallor and rash  Allergic/Immunologic: Negative for immunocompromised state  Neurological: Negative for seizures, syncope, speech difficulty, weakness, light-headedness and headaches  Psychiatric/Behavioral: Negative for confusion  All other systems reviewed and are negative  All other systems reviewed and negative  Physical Exam      ED Triage Vitals   Temperature Pulse Respirations Blood Pressure SpO2   11/08/18 1321 11/08/18 1321 11/08/18 1321 11/08/18 1321 11/08/18 1321   98 7 °F (37 1 °C) (!) 110 17 136/76 99 %      Temp Source Heart Rate Source Patient Position - Orthostatic VS BP Location FiO2 (%)   11/08/18 1321 11/08/18 1321 11/08/18 1321 11/08/18 1321 --   Tympanic Monitor Sitting Right arm       Pain Score       11/08/18 1411       4               Physical Exam   Constitutional: He is oriented to person, place, and time  He appears well-developed and well-nourished  No distress  HENT:   Head: Normocephalic and atraumatic     Nose: Nose normal    Mouth/Throat: Oropharynx is clear and moist    Eyes: Pupils are equal, round, and reactive to light  Conjunctivae and EOM are normal    Neck: Normal range of motion  Neck supple  Cardiovascular: Normal rate, regular rhythm and normal heart sounds  Exam reveals no gallop and no friction rub  No murmur heard  Pulmonary/Chest: Effort normal and breath sounds normal  No respiratory distress  He has no wheezes  He has no rales  Abdominal: Soft  Bowel sounds are normal  There is no tenderness  There is no rebound and no guarding  Musculoskeletal: Normal range of motion  Neurological: He is alert and oriented to person, place, and time  Skin: Skin is warm and dry  He is not diaphoretic  Psychiatric: He has a normal mood and affect  His behavior is normal    Nursing note and vitals reviewed  Assessment and Plan    Anne Victor is a 33 y o  male who presents with right sided pain  Physical examination remarkable for same  Differential diagnosis (not completely inclusive) includes pe, post operative complication, infection, renal colic  Plan will be to perform diagnostic testing and treat symptomatically        MDM    Diagnostic Results        Labs:    Results for orders placed or performed during the hospital encounter of 11/08/18   CBC and differential   Result Value Ref Range    WBC 8 20 4 50 - 11 00 Thousand/uL    RBC 4 69 4 50 - 5 90 Million/uL    Hemoglobin 14 9 13 5 - 17 5 g/dL    Hematocrit 43 1 41 0 - 53 0 %    MCV 92 80 - 100 fL    MCH 31 7 26 0 - 34 0 pg    MCHC 34 6 31 0 - 36 0 g/dL    RDW 13 4 <15 3 %    MPV 8 3 (L) 8 9 - 12 7 fL    Platelets 391 899 - 651 Thousands/uL    Neutrophils Relative 65 45 - 65 %    Lymphocytes Relative 24 20 - 50 %    Monocytes Relative 7 1 - 10 %    Eosinophils Relative 3 0 - 6 %    Basophils Relative 1 0 - 1 %    Neutrophils Absolute 5 30 1 80 - 7 80 Thousands/µL    Lymphocytes Absolute 2 00 0 50 - 4 00 Thousands/µL    Monocytes Absolute 0 60 0 20 - 0 90 Thousand/µL    Eosinophils Absolute 0 20 0 00 - 0 40 Thousand/µL Basophils Absolute 0 10 0 00 - 0 10 Thousands/µL   Comprehensive metabolic panel   Result Value Ref Range    Sodium 139 137 - 147 mmol/L    Potassium 4 1 3 6 - 5 0 mmol/L    Chloride 101 97 - 108 mmol/L    CO2 30 22 - 30 mmol/L    ANION GAP 8 5 - 14 mmol/L    BUN 16 5 - 25 mg/dL    Creatinine 0 74 0 70 - 1 50 mg/dL    Glucose 98 70 - 99 mg/dL    Calcium 9 3 8 4 - 10 2 mg/dL    AST 32 17 - 59 U/L    ALT 55 (H) 9 - 52 U/L    Alkaline Phosphatase 88 43 - 122 U/L    Total Protein 8 1 5 9 - 8 4 g/dL    Albumin 4 7 3 0 - 5 2 g/dL    Total Bilirubin 0 30 <1 30 mg/dL    eGFR 125 >60 ml/min/1 73sq m   Lipase   Result Value Ref Range    Lipase 53 23 - 300 u/L   UA w Reflex to Microscopic w Reflex to Culture   Result Value Ref Range    Color, UA Straw Straw, Yellow, Pale Yellow    Clarity, UA Clear Clear, Other    Specific Gravity, UA 1 015 1 003 - 1 040    pH, UA 8 0 4 5 - 8 0    Leukocytes, UA Negative Negative    Nitrite, UA Negative Negative    Protein, UA Negative Negative mg/dl    Glucose, UA Negative Negative mg/dl    Ketones, UA Negative Negative mg/dl    Bilirubin, UA Negative Negative    Blood, UA Negative Negative    UROBILINOGEN UA Negative 1 0, Negative mg/dL   ECG 12 lead   Result Value Ref Range    Ventricular Rate 87 BPM    Atrial Rate 87 BPM    MN Interval 148 ms    QRSD Interval 90 ms    QT Interval 362 ms    QTC Interval 435 ms    P Axis 53 degrees    QRS Axis 4 degrees    T Wave Axis 25 degrees       All labs reviewed and utilized in the medical decision making process    Radiology:    US gallbladder   Final Result      Hypoechoic area along the anteromedial liver which appears to correspond to recent CT finding likely represents a small rind of edematous parenchyma versus small hematoma  The appearance on CT is not unexpected for the postoperative timeframe  Follow-up may be based on clinical grounds  Enlarged fatty liver        Workstation performed: [id number]         CT pe study w abdomen pelvis w contrast   Final Result      1  Hypodensity within the gallbladder fossa  This may represent edematous liver parenchyma from recent surgery, or collection  Recommend follow-up ultrasound to help characterize  2   No pulmonary embolism      The study was marked in EPIC for immediate notification  Workstation performed: [id number]             All radiology studies independently viewed by me and interpreted by the radiologist     Procedure    Procedures    CritCare Time      ED Course of Care and Re-Assessments      Medications   sodium chloride 0 9 % bolus 1,000 mL (0 mL Intravenous Stopped 11/8/18 1514)   ketorolac (TORADOL) injection 15 mg (15 mg Intravenous Given 11/8/18 1411)   iodixanol (VISIPAQUE) 320 MG/ML injection 80 mL (80 mL Intravenous Given 11/8/18 1444)   iohexol (OMNIPAQUE) 350 MG/ML injection (MULTI-DOSE) 85 mL (85 mL Intravenous Given 11/8/18 1443)           FINAL IMPRESSION    Final diagnoses:   Post-operative pain         DISPOSITION/PLAN      Time reflects when diagnosis was documented in both MDM as applicable and the Disposition within this note     Time User Action Codes Description Comment    11/8/2018  4:48 PM Desirae Vega Add [G89 18] Post-operative pain       ED Disposition     ED Disposition Condition Comment    Discharge  Gabriele Zhong discharge to home/self care      Condition at discharge: Good        Follow-up Information     Follow up With Specialties Details Why [address] [place] [address] Emergency Department Emergency Medicine Go to As needed, If symptoms worsen [address] [address]  [address] [place] [address],[address], DO Vascular Surgery, General Surgery Schedule an appointment as soon as possible for a visit  [address] [facility] [address]              PATIENT REFERRED TO:    [facility] Emergency Department  [address]  [address] [address]  [phone]  Go to  As needed, If symptoms worsen    DO Ciera Clancy  [address] [facility] Final [address]   [phone]    Schedule an appointment as soon as possible for a visit        DISCHARGE MEDICATIONS:    Discharge Medication List as of 11/8/2018  4:48 PM      CONTINUE these medications which have NOT CHANGED    Details   amlodipine-olmesartan (YONY) 10-40 MG Take 1 tablet by mouth daily, Starting Fri 9/21/2018, Normal      oxyCODONE-acetaminophen (PERCOCET) 5-325 mg per tablet Take 1 tablet by mouth every 4 (four) hours as needed for moderate pain for up to 10 days Max Daily Amount: 6 tablets, Starting Tue 10/30/2018, Until Fri 11/9/2018, Print             No discharge procedures on file           Kathryn Marshall, [address] [place] [address], DO  11/09/18 5478

## 2018-11-08 NOTE — DISCHARGE INSTRUCTIONS
Pain Management After Surgery   WHAT YOU NEED TO KNOW:   It is important to manage your pain after surgery so you can rest and heal  Pain management will also help you return to your normal activities  DISCHARGE INSTRUCTIONS:   Medicines:   · Acetaminophen  helps decrease pain  Follow directions  This medicine can cause liver damage if not taken correctly  · NSAIDs , such as ibuprofen, help decrease swelling, pain, and fever  This medicine is available with or without a doctor's order  NSAIDs can cause stomach bleeding or kidney problems in certain people  If you take blood thinner medicine, always ask your healthcare provider if NSAIDs are safe for you  Always read the medicine label and follow directions  · Prescription pain medicine  can be given as a pill, a patch, or a cream  Ask how to take this medicine safely  · Anxiety medicine  may help you feel less anxious and decrease your pain  · Take your medicine as directed  Call your healthcare provider if you think your medicine is not helping or if you have side effects  Tell him if you are allergic to any medicine  Keep a list of the medicines, vitamins, and herbs you take  Include the amounts, and when and why you take them  Bring the list or the pill bottles to follow-up visits  Carry your medicine list with you in case of an emergency  Follow up with your healthcare provider as directed:  Write down your questions so you remember to ask them during your visits  Manage your pain:   · Apply heat  on your surgery area for 20 to 30 minutes every 2 hours as directed  Heat helps decrease pain and muscle spasms  · Apply ice  on your surgery area for 15 to 20 minutes every hour or as directed  Use an ice pack, or put crushed ice in a plastic bag  Cover it with a towel  Ice helps prevent tissue damage and decreases swelling and pain  · Massage therapy  may help relax tight muscles and decrease pain       · Physical therapy  teaches you exercises to help improve movement and strength and decrease pain  · Self-hypnosis  is a way to direct your attention to something other than your pain  For example, you might repeat a positive statement about ignoring the pain or seeing the pain in a positive way  · Music  may help increase your energy level and improve your mood  It may help reduce pain by triggering your body to release endorphins  These are natural body chemicals that decrease pain  · Distraction  teaches you to focus your attention on something other than pain  For example, play cards, visit with family, or watch TV  · Transcutaneous electrical nerve stimulation (TENS)  is a portable device that is placed over the area of pain  It uses mild, safe electrical signals to help control pain  · Spinal cord stimulation (SCS)  uses mild, safe electrical signals to relax the nerves that cause your pain  An electrode is implanted near your spinal cord during a procedure  Contact your healthcare provider if:   · Your pain does not go away, or gets worse, even after you take medicine  · You feel too sleepy or groggy  · You have itching, a rash, or nausea from your medicine  · You have questions or concerns about your condition or care  Seek care immediately or call 911 if:   · You have severe pain  © 2016 [address] is for End User's use only and may not be sold, redistributed or otherwise used for commercial purposes  All illustrations and images included in CareNotes® are the copyrighted property of [employer]  or [employer]on [employer]  The above information is an  only  It is not intended as medical advice for individual conditions or treatments  Talk to your doctor, nurse or pharmacist before following any medical regimen to see if it is safe and effective for you

## 2019-01-05 ENCOUNTER — HOSPITAL ENCOUNTER (EMERGENCY)
Facility: HOSPITAL | Age: 29
Discharge: HOME | End: 2019-01-05
Attending: EMERGENCY MEDICINE
Payer: COMMERCIAL

## 2019-01-05 VITALS
RESPIRATION RATE: 20 BRPM | SYSTOLIC BLOOD PRESSURE: 181 MMHG | DIASTOLIC BLOOD PRESSURE: 111 MMHG | HEART RATE: 119 BPM | WEIGHT: 256 LBS | TEMPERATURE: 99.7 F | OXYGEN SATURATION: 100 % | BODY MASS INDEX: 37.8 KG/M2

## 2019-01-05 DIAGNOSIS — N41.9 PROSTATITIS: Primary | ICD-10-CM

## 2019-01-05 LAB
BACTERIA UR QL AUTO: ABNORMAL /HPF
BILIRUB UR QL STRIP: NEGATIVE
CLARITY UR: CLEAR
COLOR UR: ABNORMAL
GLUCOSE UR STRIP-MCNC: NEGATIVE MG/DL
HGB UR QL STRIP.AUTO: 25
KETONES UR STRIP-MCNC: NEGATIVE MG/DL
LEUKOCYTE ESTERASE UR QL STRIP: NEGATIVE
NITRITE UR QL STRIP: NEGATIVE
NON-SQ EPI CELLS URNS QL MICRO: ABNORMAL /HPF
PH UR STRIP.AUTO: 7 [PH] (ref 4.5–8)
PROT UR STRIP-MCNC: NEGATIVE MG/DL
RBC #/AREA URNS AUTO: ABNORMAL /HPF
SP GR UR STRIP.AUTO: 1.01 (ref 1–1.04)
UROBILINOGEN UA: NEGATIVE MG/DL
WBC #/AREA URNS AUTO: ABNORMAL /HPF

## 2019-01-05 PROCEDURE — 81001 URINALYSIS AUTO W/SCOPE: CPT | Performed by: PHYSICIAN ASSISTANT

## 2019-01-05 PROCEDURE — 87491 CHLMYD TRACH DNA AMP PROBE: CPT | Performed by: PHYSICIAN ASSISTANT

## 2019-01-05 PROCEDURE — 81003 URINALYSIS AUTO W/O SCOPE: CPT | Performed by: PHYSICIAN ASSISTANT

## 2019-01-05 PROCEDURE — 96372 THER/PROPH/DIAG INJ SC/IM: CPT

## 2019-01-05 PROCEDURE — 99283 EMERGENCY DEPT VISIT LOW MDM: CPT

## 2019-01-05 PROCEDURE — 87591 N.GONORRHOEAE DNA AMP PROB: CPT | Performed by: PHYSICIAN ASSISTANT

## 2019-01-05 RX ORDER — IBUPROFEN 600 MG/1
600 TABLET ORAL EVERY 6 HOURS PRN
Qty: 30 TABLET | Refills: 0 | Status: SHIPPED | OUTPATIENT
Start: 2019-01-05

## 2019-01-05 RX ORDER — AZITHROMYCIN 250 MG/1
1000 TABLET, FILM COATED ORAL ONCE
Status: COMPLETED | OUTPATIENT
Start: 2019-01-05 | End: 2019-01-05

## 2019-01-05 RX ORDER — CIPROFLOXACIN 500 MG/1
500 TABLET, FILM COATED ORAL 2 TIMES DAILY
Qty: 20 TABLET | Refills: 0 | Status: SHIPPED | OUTPATIENT
Start: 2019-01-05 | End: 2019-01-11 | Stop reason: ALTCHOICE

## 2019-01-05 RX ADMIN — CEFTRIAXONE SODIUM 250 MG: 250 INJECTION, POWDER, FOR SOLUTION INTRAMUSCULAR; INTRAVENOUS at 21:27

## 2019-01-05 RX ADMIN — AZITHROMYCIN 1000 MG: 250 TABLET, FILM COATED ORAL at 21:28

## 2019-01-06 NOTE — DISCHARGE INSTRUCTIONS
Prostatitis   WHAT YOU NEED TO KNOW:   Prostatitis is inflammation of the prostate gland  The prostate gland is the male sex gland that makes semen  It is about the size of a walnut and it is located under the bladder  You can get prostatitis at any age, and you may get it more than once  It may be an acute (short-term) or chronic (long-term) condition  Prostatitis is not contagious  DISCHARGE INSTRUCTIONS:   Medicines:   · Alpha blockers: This medicine relaxes the muscles in your prostate and bladder  It may help you to urinate more easily  · Antibiotics: This medicine is given to fight or prevent an infection caused by bacteria  Always take your antibiotics exactly as ordered by your healthcare provider  Do not stop taking your medicine unless directed by your healthcare provider  Never save antibiotics or take leftover antibiotics that were given to you for another illness  · NSAIDs:  These medicines decrease swelling, pain, and fever  NSAIDs are available without a doctor's order  Ask your healthcare provider which medicine is right for you  Ask how much to take and when to take it  Take as directed  NSAIDs can cause stomach bleeding and kidney problems if not taken correctly  · Pain medicine: You may be given a prescription medicine to decrease pain  Do not wait until the pain is severe before you take this medicine  · Take your medicine as directed  Contact your healthcare provider if you think your medicine is not helping or if you have side effects  Tell him of her if you are allergic to any medicine  Keep a list of the medicines, vitamins, and herbs you take  Include the amounts, and when and why you take them  Bring the list or the pill bottles to follow-up visits  Carry your medicine list with you in case of an emergency  Follow up with your healthcare provider as directed:  Write down your questions so you remember to ask them during your visits     Self-care:   · Prostate massage may be used to treat chronic prostatitis  It can help to decrease fullness and prevent infection  Healthcare providers may teach you how to do a prostate massage  · Place a heating pad on the prostate area to help blood flow to that area  Warm baths may decrease prostate fullness and discomfort  · Drink plenty of liquids to prevent dehydration  Ask your healthcare provider how much liquid to drink each day and which liquids are best for you  · Do not drink alcohol or eat spicy foods until you have finished treatment for prostatitis  Limit the amount of caffeine you drink  · Urinate often  Do not wait to urinate  · You may have sex if you feel well  · Go to a healthcare provider for a prostate exam once every year if you are over the age of 36  Contact your healthcare provider if:   · You have a fever  · You see blood in your urine  · You cannot urinate  · Your symptoms are getting worse, or they return after you have been treated  · You have questions or concerns about your condition or care  © 2017 [employer] Information is for End User's use only and may not be sold, redistributed or otherwise used for commercial purposes  All illustrations and images included in CareNotes® are the copyrighted property of A D A M [employer] Inc  or [employer]on [employer]  The above information is an  only  It is not intended as medical advice for individual conditions or treatments  Talk to your doctor, nurse or pharmacist before following any medical regimen to see if it is safe and effective for you

## 2019-01-06 NOTE — ED PROVIDER NOTES
History  Chief Complaint   Patient presents with    Painful Urination     "I have been having pain when I urinate "       History provided by:  Patient   used: No    Medical Problem   Location:  Pt with burning with urination   x 3 days it went away but has perineal pain now  no rectum pain   Severity:  Mild  Onset quality:  Gradual  Duration:  3 days  Timing:  Constant  Progression:  Improving  Chronicity:  New  Associated symptoms: no abdominal pain, no chest pain, no congestion, no cough, no diarrhea, no ear pain, no fatigue, no fever, no headaches, no loss of consciousness, no myalgias, no nausea, no rash, no rhinorrhea, no shortness of breath, no sore throat, no vomiting and no wheezing        Prior to Admission Medications   Prescriptions Last Dose Informant Patient Reported? Taking?   amlodipine-olmesartan (YONY) 10-40 MG   No No   Sig: Take 1 tablet by mouth daily      Facility-Administered Medications: None       Past Medical History:   Diagnosis Date    Fatty liver 09/2018    per ct abdomen    Hypertension     Obesity        Past Surgical History:   Procedure Laterality Date    NO PAST SURGERIES      NH LAP,CHOLECYSTECTOMY N/A 10/30/2018    Procedure: LAPAROSCOPIC CHOLECYSTECTOMY;  Surgeon: Lona Munguia DO;  Location: [facility] MAIN OR;  Service: General       Family History   Problem Relation Age of Onset    Hypertension Mother     Diabetes Mother     Diabetes Maternal Grandmother     Diabetes Maternal Grandfather     Lung cancer Family      I have reviewed and agree with the history as documented  Social History   Substance Use Topics    Smoking status: Never Smoker    Smokeless tobacco: Never Used    Alcohol use Yes      Comment: Social        Review of Systems   Constitutional: Negative  Negative for fatigue and fever  HENT: Negative  Negative for congestion, ear pain, rhinorrhea and sore throat  Eyes: Negative  Respiratory: Negative    Negative for cough, shortness of breath and wheezing  Cardiovascular: Negative  Negative for chest pain  Gastrointestinal: Negative  Negative for abdominal pain, diarrhea, nausea and vomiting  Endocrine: Negative  Genitourinary: Negative  Musculoskeletal: Negative  Negative for myalgias  Skin: Negative for rash  Allergic/Immunologic: Negative  Neurological: Negative  Negative for loss of consciousness and headaches  Hematological: Negative  Psychiatric/Behavioral: Negative  All other systems reviewed and are negative  Physical Exam  Physical Exam   Constitutional: He is oriented to person, place, and time  He appears well-developed and well-nourished  HENT:   Head: Normocephalic and atraumatic  Right Ear: External ear normal    Left Ear: External ear normal    Nose: Nose normal    Mouth/Throat: Oropharynx is clear and moist    Eyes: Pupils are equal, round, and reactive to light  Conjunctivae and EOM are normal    Neck: Normal range of motion  Neck supple  Cardiovascular: Normal rate, regular rhythm and normal heart sounds  Pulmonary/Chest: Effort normal and breath sounds normal    Abdominal: Soft  Bowel sounds are normal    Genitourinary: Rectum normal and penis normal    Genitourinary Comments: Testes wnl not swollen no pain no hernia   Pt with perineal pressure  No erythema no swelling   Perirectal wnl    Musculoskeletal: Normal range of motion  Neurological: He is alert and oriented to person, place, and time  Skin: Skin is warm  Psychiatric: He has a normal mood and affect  His behavior is normal    Nursing note and vitals reviewed        Vital Signs  ED Triage Vitals [01/05/19 2052]   Temperature Pulse Respirations Blood Pressure SpO2   99 7 °F (37 6 °C) (!) 119 20 (!) 181/111 100 %      Temp Source Heart Rate Source Patient Position - Orthostatic VS BP Location FiO2 (%)   Tympanic Monitor Sitting Right arm --      Pain Score       --           Vitals:    01/05/19 2052   BP: (!) 181/111   Pulse: (!) 119   Patient Position - Orthostatic VS: Sitting       Visual Acuity      ED Medications  Medications   cefTRIAXone (ROCEPHIN) 250 mg in lidocaine (PF) (XYLOCAINE-MPF) 1 % IM only syringe (250 mg Intramuscular Given 1/5/19 2127)   azithromycin (ZITHROMAX) tablet 1,000 mg (1,000 mg Oral Given 1/5/19 2128)       Diagnostic Studies  Results Reviewed     Procedure Component Value Units Date/Time    Urine Microscopic [[id number]]  (Abnormal) Collected:  01/05/19 2107    Lab Status:  Final result Specimen:  Urine from Urine, Clean Catch Updated:  01/05/19 2123     RBC, UA 2-4 (A) /hpf      WBC, UA 0-1 (A) /hpf      Epithelial Cells None Seen /hpf      Bacteria, UA None Seen /hpf     UA w Reflex to Microscopic w Reflex to Culture [[id number]]  (Abnormal) Collected:  01/05/19 2107    Lab Status:  Final result Specimen:  Urine from Urine, Clean Catch Updated:  01/05/19 2116     Color, UA Straw     Clarity, UA Clear     Specific Gravity, UA 1 010     pH, UA 7 0     Leukocytes, UA Negative     Nitrite, UA Negative     Protein, UA Negative mg/dl      Glucose, UA Negative mg/dl      Ketones, UA Negative mg/dl      Bilirubin, UA Negative     Blood, UA 25 0 (A)     UROBILINOGEN UA Negative mg/dL     Chlamydia/GC amplified DNA by PCR [[id number]] Collected:  01/05/19 2107    Lab Status: In process Specimen:  Urine, Other Updated:  01/05/19 2112                 No orders to display              Procedures  Procedures       Phone Contacts  ED Phone Contact    ED Course                               MDM  CritCare Time    Disposition  Final diagnoses:   Prostatitis     Time reflects when diagnosis was documented in both MDM as applicable and the Disposition within this note     Time User Action Codes Description Comment    1/5/2019  9:22 PM Cole Wagner   Add [N41 9] Prostatitis       ED Disposition     ED Disposition Condition Comment    Discharge  Taylor Vega IrizarryCordero discharge to home/self care     Condition at discharge: Good        Follow-up Information     Follow up With Specialties Details Why Contact Info    Wolf Calderon MD Family Medicine Schedule an appointment as soon as possible for a visit  [id number] [facility] [address]             Discharge Medication List as of 1/5/2019  9:24 PM      START taking these medications    Details   ciprofloxacin (CIPRO) 500 mg tablet Take 1 tablet (500 mg total) by mouth 2 (two) times a day for 10 days, Starting Sat 1/5/2019, Until Tue 1/15/2019, Print         CONTINUE these medications which have NOT CHANGED    Details   amlodipine-olmesartan (YONY) 10-40 MG Take 1 tablet by mouth daily, Starting Fri 9/21/2018, Normal           No discharge procedures on file      ED Provider  Electronically Signed by           Noah Morrison PA-C  01/05/19 0610

## 2019-01-07 LAB
C TRACH DNA SPEC QL NAA+PROBE: NEGATIVE
N GONORRHOEA DNA SPEC QL NAA+PROBE: NEGATIVE

## 2019-01-11 ENCOUNTER — OFFICE VISIT (OUTPATIENT)
Dept: FAMILY MEDICINE CLINIC | Facility: CLINIC | Age: 29
End: 2019-01-11
Payer: COMMERCIAL

## 2019-01-11 VITALS
HEIGHT: 69 IN | OXYGEN SATURATION: 98 % | BODY MASS INDEX: 37.47 KG/M2 | TEMPERATURE: 98 F | DIASTOLIC BLOOD PRESSURE: 90 MMHG | HEART RATE: 97 BPM | SYSTOLIC BLOOD PRESSURE: 146 MMHG | WEIGHT: 253 LBS | RESPIRATION RATE: 16 BRPM

## 2019-01-11 DIAGNOSIS — N52.9 IMPOTENCE: ICD-10-CM

## 2019-01-11 DIAGNOSIS — Z23 NEEDS FLU SHOT: Primary | ICD-10-CM

## 2019-01-11 DIAGNOSIS — N41.0 ACUTE PROSTATITIS: ICD-10-CM

## 2019-01-11 DIAGNOSIS — I10 ESSENTIAL HYPERTENSION, BENIGN: ICD-10-CM

## 2019-01-11 PROCEDURE — 99214 OFFICE O/P EST MOD 30 MIN: CPT | Performed by: FAMILY MEDICINE

## 2019-01-11 PROCEDURE — 3008F BODY MASS INDEX DOCD: CPT | Performed by: FAMILY MEDICINE

## 2019-01-11 RX ORDER — AMOXICILLIN AND CLAVULANATE POTASSIUM 875; 125 MG/1; MG/1
1 TABLET, FILM COATED ORAL EVERY 12 HOURS SCHEDULED
Qty: 20 TABLET | Refills: 0 | Status: SHIPPED | OUTPATIENT
Start: 2019-01-11 | End: 2019-01-21

## 2019-01-11 NOTE — PROGRESS NOTES
Assessment/Plan:  1  Impotence  We discussed about causes of Erectile dysfunction  encourage to avoid the  use cocaine, heroin, or other illegal drugs  Some medicines can cause erection problems  Also, some medicines can have dangerous interactions with medicines that are prescribed for ED, including over-the-counter medicines and herbal products   Reduce stress by increasing amount of exercise in your daily life  Changes in your job, family, relationships, home life, and other areas can cause stress that adds to erection problems  Take time for more foreplay  Worrying about your erections may only make things worse  And last but not least to talk with  partner about this concerns:      - Testosterone, free, total; Future  - Prolactin; Future  - Luteinizing hormone; Future    2  Acute prostatitis  Patient having side effect with Levaquin as pain in joints and tendons  Changing to amoxicillin with clavulanate twice a day for 10 days  - amoxicillin-clavulanate (AUGMENTIN) 875-125 mg per tablet; Take 1 tablet by mouth every 12 (twelve) hours for 10 days  Dispense: 20 tablet; Refill: 0    3  Needs flu shot    Patient declined influenza vaccine:   Despite the explanation for the need of flu Immunization need, and the  flu  possible complications and high risk for illness and death, patient declined influenza immunization      4  Essential hypertension, benign  HTN/SUBOPTIMAL CONTROL: I discussed with patient regarding the need of compliance with medications  Exercise was encouraged as a change of life style modification  The main goal of treatment is to decrease the risk of mortality and of cardiovascular and renal morbidity  BP goal should be less than 130/80 mmHg in patients with renal disease, and less than 140/90 mmHg in all other patients  No problem-specific Assessment & Plan notes found for this encounter         Diagnoses and all orders for this visit:    Needs flu shot  -     PREFERRED: influenza vaccine, [id number], quadrivalent, recombinant, PF, 0 5 mL (FLUBLOK)    Impotence  -     Testosterone, free, total; Future  -     Prolactin; Future  -     Luteinizing hormone; Future    Acute prostatitis  -     amoxicillin-clavulanate (AUGMENTIN) 875-125 mg per tablet; Take 1 tablet by mouth every 12 (twelve) hours for 10 days          Subjective:      Patient ID: Luz Marina Malik is a 33 y o  male  Patient was seen in the emergency room for prostatitis, was prescribed Levaquin, his medications causing tendon pain is described and later sure  He needs to change treatment  He also suffering erectile dysfunction  He understands hypertension can cause erectile dysfunction as well the treatment that he is taking  Discontinue hydrochlorothiazide in the past due to the same problem  He wants to be checked testosterone levels  He denies any chest pain or shortness of breath at this time  The following portions of the patient's history were reviewed and updated as appropriate: allergies, current medications, past family history, past medical history, past social history, past surgical history and problem list     Review of Systems   Constitutional: Negative for activity change, appetite change, fatigue and fever  HENT: Negative for congestion, dental problem, ear pain, sinus pain and trouble swallowing  Eyes: Negative for discharge, redness and itching  Respiratory: Negative for cough, shortness of breath and wheezing  Cardiovascular: Negative for chest pain  Gastrointestinal: Negative for abdominal distention and abdominal pain  Genitourinary: Negative for difficulty urinating, dysuria and enuresis  Musculoskeletal: Negative for arthralgias, back pain and gait problem  Neurological: Negative for dizziness and headaches  Hematological: Negative for adenopathy  Does not bruise/bleed easily  Psychiatric/Behavioral: Negative for behavioral problems  Objective:      /90 (BP Location: Left arm, Patient Position: Sitting, Cuff Size: Standard)   Pulse 97   Temp 98 °F (36 7 °C) (Oral)   Resp 16   Ht 5' 9" (1 753 m)   Wt 115 kg (253 lb)   SpO2 98%   BMI 37 36 kg/m²          Physical Exam   Constitutional: He is oriented to person, place, and time  He appears well-developed  HENT:   Head: Normocephalic  Eyes: Pupils are equal, round, and reactive to light  Neck: Normal range of motion  Cardiovascular: Normal rate  Pulmonary/Chest: Effort normal and breath sounds normal    Abdominal: Soft  Genitourinary: Penis normal    Musculoskeletal: Normal range of motion  Neurological: He is alert and oriented to person, place, and time  Skin: Skin is warm and dry  Psychiatric: He has a normal mood and affect   His behavior is normal  Judgment and thought content normal

## 2019-01-17 PROBLEM — K80.10 CHOLECYSTITIS WITH CHOLELITHIASIS: Status: RESOLVED | Noted: 2018-10-30 | Resolved: 2019-01-17

## 2019-01-19 ENCOUNTER — APPOINTMENT (OUTPATIENT)
Dept: LAB | Facility: HOSPITAL | Age: 29
End: 2019-01-19
Payer: COMMERCIAL

## 2019-01-19 DIAGNOSIS — N52.9 IMPOTENCE: ICD-10-CM

## 2019-01-19 LAB
LH SERPL-ACNC: 4 MIU/ML (ref 1.2–10.6)
PROLACTIN SERPL-MCNC: 23.2 NG/ML (ref 2.5–17.4)

## 2019-01-19 PROCEDURE — 83002 ASSAY OF GONADOTROPIN (LH): CPT

## 2019-01-19 PROCEDURE — 84402 ASSAY OF FREE TESTOSTERONE: CPT

## 2019-01-19 PROCEDURE — 36415 COLL VENOUS BLD VENIPUNCTURE: CPT

## 2019-01-19 PROCEDURE — 84146 ASSAY OF PROLACTIN: CPT

## 2019-01-19 PROCEDURE — 84403 ASSAY OF TOTAL TESTOSTERONE: CPT

## 2019-01-21 LAB
TESTOST FREE SERPL-MCNC: 7.3 PG/ML (ref 8.7–25.1)
TESTOST SERPL-MCNC: 327 NG/DL (ref 264–916)

## 2019-01-22 DIAGNOSIS — E22.1 HYPERPROLACTINEMIA (HCC): Primary | ICD-10-CM

## 2019-01-28 ENCOUNTER — TRANSCRIBE ORDERS (OUTPATIENT)
Dept: ADMINISTRATIVE | Facility: HOSPITAL | Age: 29
End: 2019-01-28

## 2019-01-28 ENCOUNTER — APPOINTMENT (OUTPATIENT)
Dept: LAB | Facility: HOSPITAL | Age: 29
End: 2019-01-28
Payer: COMMERCIAL

## 2019-01-28 DIAGNOSIS — E22.1 HYPERPROLACTINEMIA (HCC): ICD-10-CM

## 2019-01-28 LAB — PROLACTIN SERPL-MCNC: 9.3 NG/ML (ref 2.5–17.4)

## 2019-01-28 PROCEDURE — 84146 ASSAY OF PROLACTIN: CPT

## 2019-01-28 PROCEDURE — 36415 COLL VENOUS BLD VENIPUNCTURE: CPT

## 2019-02-01 ENCOUNTER — TELEPHONE (OUTPATIENT)
Dept: FAMILY MEDICINE CLINIC | Facility: CLINIC | Age: 29
End: 2019-02-01

## 2019-02-04 NOTE — TELEPHONE ENCOUNTER
Please call patient, prolactin was normal but testosterone free is slightly lower  To see endocrinology

## 2019-02-21 DIAGNOSIS — R79.89 PROLACTIN INCREASED: Primary | ICD-10-CM

## (undated) DEVICE — GARMENT,MEDLINE,DVT,INT,CALF,FOAM,MED: Brand: MEDLINE

## (undated) DEVICE — PAD GROUNDING ADULT

## (undated) DEVICE — ASTOUND STANDARD SURGICAL GOWN, XL: Brand: CONVERTORS

## (undated) DEVICE — CHLORAPREP HI-LITE 26ML ORANGE

## (undated) DEVICE — BLADED TROCAR WITH SMOOTH CANNULA: Brand: VERSAONE

## (undated) DEVICE — INSUFFLATION NEEDLE: Brand: SURGINEEDLE

## (undated) DEVICE — SYRINGE 30ML LL

## (undated) DEVICE — SUT VICRYL 0 UR-6 27 IN J603H

## (undated) DEVICE — TABLE COVER: Brand: CONVERTORS

## (undated) DEVICE — PLASTIC ADHESIVE BANDAGE: Brand: CURITY

## (undated) DEVICE — SWITCH BLADE TIP CURVED METZ

## (undated) DEVICE — SURGICAL CLIPPER BLADE GENERAL USE

## (undated) DEVICE — STANDARD SURGICAL GOWN, L: Brand: CONVERTORS

## (undated) DEVICE — INTENDED FOR TISSUE SEPARATION, AND OTHER PROCEDURES THAT REQUIRE A SHARP SURGICAL BLADE TO PUNCTURE OR CUT.: Brand: BARD-PARKER SAFETY BLADES SIZE 11, STERILE

## (undated) DEVICE — PROXIMATE PLUS MD MULTI-DIRECTIONAL RELEASE SKIN STAPLERS CONTAINS 35 STAINLESS STEEL STAPLES APPROXIMATE CLOSED DIMENSIONS: 6.9MM X 3.9MM WIDE: Brand: PROXIMATE

## (undated) DEVICE — INTENDED FOR TISSUE SEPARATION, AND OTHER PROCEDURES THAT REQUIRE A SHARP SURGICAL BLADE TO PUNCTURE OR CUT.: Brand: BARD-PARKER SAFETY BLADES SIZE 15, STERILE

## (undated) DEVICE — Device: Brand: OMNICLOSE TROCAR SITE CLOSURE DEVICE

## (undated) DEVICE — GAUZE SPONGES,16 PLY: Brand: CURITY

## (undated) DEVICE — APPLIER INT CLIP ENDO TI 10MM MED LRG LIGAT PSTL GRP

## (undated) DEVICE — STERILE POLYISOPRENE POWDER-FREE SURGICAL GLOVES: Brand: PROTEXIS

## (undated) DEVICE — OPTICAL TROCAR: Brand: VISIPORT PLUS

## (undated) DEVICE — IRRIG ENDO FLO TUBING

## (undated) DEVICE — 4-PORT MANIFOLD: Brand: NEPTUNE 2

## (undated) DEVICE — SUT PROLENE 3-0 SH 36 IN 8522H

## (undated) DEVICE — ALLENTOWN LAP CHOLE APP PACK: Brand: CARDINAL HEALTH

## (undated) DEVICE — TUBING SET W. FILTER, GAS FLOW 30 L/MIN: Brand: N.A.

## (undated) DEVICE — OCCLUSIVE GAUZE STRIP,3% BISMUTH TRIBROMOPHENATE IN PETROLATUM BLEND: Brand: XEROFORM

## (undated) DEVICE — NEEDLE BLUNT 18 G X 1 1/2IN

## (undated) DEVICE — CORD MONOPOLAR STERILE DISPOSABLE

## (undated) DEVICE — FLEXIBLE ADHESIVE BANDAGE,X-LARGE: Brand: CURITY

## (undated) DEVICE — STERILE POLYISOPRENE POWDER-FREE SURGICAL GLOVES WITH EMOLLIENT COATING: Brand: PROTEXIS

## (undated) DEVICE — SUT ETHILON 3-0 FSLX 30 IN 1673H